# Patient Record
Sex: FEMALE | Employment: FULL TIME | URBAN - METROPOLITAN AREA
[De-identification: names, ages, dates, MRNs, and addresses within clinical notes are randomized per-mention and may not be internally consistent; named-entity substitution may affect disease eponyms.]

---

## 2023-03-13 PROBLEM — R60.9 PERIPHERAL EDEMA: Status: ACTIVE | Noted: 2023-03-13

## 2023-03-13 PROBLEM — N92.6 ABNORMAL MENSES: Status: ACTIVE | Noted: 2023-03-13

## 2023-03-13 PROBLEM — M72.2 PLANTAR FASCIITIS, BILATERAL: Status: ACTIVE | Noted: 2023-03-13

## 2023-03-13 PROBLEM — M79.89 SWELLING OF BOTH LOWER EXTREMITIES: Status: ACTIVE | Noted: 2023-03-13

## 2023-03-13 PROBLEM — R60.0 PERIPHERAL EDEMA: Status: ACTIVE | Noted: 2023-03-13

## 2023-03-13 PROBLEM — I10 HYPERTENSION: Status: ACTIVE | Noted: 2023-03-13

## 2023-03-13 PROBLEM — Z86.16 HISTORY OF COVID-19: Status: ACTIVE | Noted: 2023-03-13

## 2023-03-13 PROBLEM — K21.9 ESOPHAGEAL REFLUX: Status: ACTIVE | Noted: 2023-03-13

## 2023-03-13 PROBLEM — E66.812 CLASS 2 OBESITY WITH BODY MASS INDEX (BMI) OF 38.0 TO 38.9 IN ADULT: Status: ACTIVE | Noted: 2023-03-13

## 2023-03-13 PROBLEM — N64.52 BLOODY DISCHARGE FROM NIPPLE: Status: ACTIVE | Noted: 2023-03-13

## 2023-03-13 PROBLEM — I83.90 VARICOSE VEIN OF LEG: Status: ACTIVE | Noted: 2023-03-13

## 2023-03-13 PROBLEM — B35.1 FUNGAL NAIL INFECTION: Status: ACTIVE | Noted: 2023-03-13

## 2023-03-13 PROBLEM — R53.83 FATIGUE: Status: ACTIVE | Noted: 2023-03-13

## 2023-03-13 PROBLEM — L98.9 SKIN LESIONS: Status: ACTIVE | Noted: 2023-03-13

## 2023-03-13 PROBLEM — G47.30 SAS (SLEEP APNEA SYNDROME): Status: ACTIVE | Noted: 2023-03-13

## 2023-03-13 PROBLEM — E55.9 VITAMIN D DEFICIENCY: Status: ACTIVE | Noted: 2023-03-13

## 2023-03-13 PROBLEM — G47.33 OBSTRUCTIVE SLEEP APNEA, ADULT: Status: ACTIVE | Noted: 2023-03-13

## 2023-03-13 PROBLEM — M21.40 PES PLANUS: Status: ACTIVE | Noted: 2023-03-13

## 2023-03-13 PROBLEM — E66.9 CLASS 2 OBESITY WITH BODY MASS INDEX (BMI) OF 38.0 TO 38.9 IN ADULT: Status: ACTIVE | Noted: 2023-03-13

## 2023-03-13 RX ORDER — BIOTIN 1 MG
1000 TABLET ORAL DAILY
COMMUNITY
Start: 2021-07-27

## 2023-03-13 RX ORDER — LEVONORGESTREL 52 MG/1
INTRAUTERINE DEVICE INTRAUTERINE
COMMUNITY
Start: 2015-05-21

## 2023-03-13 RX ORDER — TRIAMTERENE AND HYDROCHLOROTHIAZIDE 37.5; 25 MG/1; MG/1
1 CAPSULE ORAL DAILY
COMMUNITY
Start: 2016-08-04 | End: 2023-11-07 | Stop reason: SDUPTHER

## 2023-03-13 RX ORDER — ACETAMINOPHEN 500 MG
50 TABLET ORAL DAILY
COMMUNITY
Start: 2021-07-27

## 2023-03-15 ENCOUNTER — OFFICE VISIT (OUTPATIENT)
Dept: PRIMARY CARE | Facility: CLINIC | Age: 43
End: 2023-03-15
Payer: COMMERCIAL

## 2023-03-15 VITALS
HEIGHT: 69 IN | HEART RATE: 69 BPM | WEIGHT: 250 LBS | TEMPERATURE: 97.9 F | SYSTOLIC BLOOD PRESSURE: 120 MMHG | DIASTOLIC BLOOD PRESSURE: 72 MMHG | BODY MASS INDEX: 37.03 KG/M2 | OXYGEN SATURATION: 97 %

## 2023-03-15 DIAGNOSIS — R45.4 DIFFICULTY CONTROLLING ANGER: ICD-10-CM

## 2023-03-15 DIAGNOSIS — Z00.00 PHYSICAL EXAM: Primary | ICD-10-CM

## 2023-03-15 PROCEDURE — 3078F DIAST BP <80 MM HG: CPT | Performed by: NURSE PRACTITIONER

## 2023-03-15 PROCEDURE — 99214 OFFICE O/P EST MOD 30 MIN: CPT | Performed by: NURSE PRACTITIONER

## 2023-03-15 PROCEDURE — 3074F SYST BP LT 130 MM HG: CPT | Performed by: NURSE PRACTITIONER

## 2023-03-15 ASSESSMENT — PATIENT HEALTH QUESTIONNAIRE - PHQ9
1. LITTLE INTEREST OR PLEASURE IN DOING THINGS: NOT AT ALL
2. FEELING DOWN, DEPRESSED OR HOPELESS: NOT AT ALL
SUM OF ALL RESPONSES TO PHQ9 QUESTIONS 1 AND 2: 0

## 2023-03-15 ASSESSMENT — ENCOUNTER SYMPTOMS
DEPRESSION: 0
LOSS OF SENSATION IN FEET: 0
ROS SKIN COMMENTS: AS IN HPI
OCCASIONAL FEELINGS OF UNSTEADINESS: 0

## 2023-03-15 ASSESSMENT — LIFESTYLE VARIABLES
HOW OFTEN DO YOU HAVE A DRINK CONTAINING ALCOHOL: 2-4 TIMES A MONTH
HOW OFTEN DO YOU HAVE A DRINK CONTAINING ALCOHOL: 2-4 TIMES A MONTH

## 2023-03-15 NOTE — PROGRESS NOTES
"Subjective   Patient ID: Marcelle Macdonald is a 42 y.o. female who presents [place] [facility] [place]          HPI   Patient was previously seen by -states she has changed providers because this location is closer to her home.  Patient with past medical history of hypertension states hypertension started after childbirth and preeclampsia.  Currently today blood pressure is controlled.  She also states she gets periodic swelling of her lower extremities.    Patient also discussed that she has some problems with her anger at times and would like to speak to someone on how to manage the anger that she has.  She also states she has a spot on the right side of her lower lip approximately 6 months ago-Denies trauma or injury to the lip.  She states she has not noticed any significant change to the appearance of the discoloration.    Review of Systems   Skin:         As in [facility] [place]   All other systems reviewed and are negative.      Objective   /72   Pulse 69   Temp 36.6 °C (97.9 °F)   Ht 1.753 m (5' 9\")   Wt 113 kg (250 lb)   SpO2 97%   BMI 36.92 kg/m²     Physical Exam  Vitals reviewed.   Constitutional:       Appearance: Normal appearance.   Cardiovascular:      Rate and Rhythm: Normal rate and regular rhythm.   Pulmonary:      Effort: Pulmonary effort is normal.      Breath sounds: Normal breath sounds.   Abdominal:      General: Abdomen is flat.      Palpations: Abdomen is soft.   Musculoskeletal:         General: Normal range of motion.      Cervical back: Normal range of motion and neck supple.   Skin:     General: Skin is warm.      Comments: Small macular darkened pigmentation noted on right side of the lower lip.   Neurological:      Mental Status: She is alert and oriented to person, place, and time.         Assessment/Plan   Problem List Items Addressed This Visit    None  Visit Diagnoses       Physical exam    -  Primary    Relevant Orders    T-Spot TB    CBC    Comprehensive Metabolic Panel    Lipid " Panel    Hemoglobin A1C    Tsh With Reflex To Free T4 If Abnormal    Vitamin D 25-Hydroxy,Total  Patient advised to closely monitor pigment discoloration on lower lip and inform if  any changes.    Will refer as dermatology if needed.     Difficulty controlling anger        Relevant Orders    Referral to Psychology

## 2023-03-15 NOTE — PATIENT INSTRUCTIONS
Continue medications as prescribed.  Healthy diet and drink plenty of water.  Please have labs drawn.    Please schedule all necessary health screenings ophthalmology and dentist.    Follow-up in 6 months sooner if needed.  Call office any new concerns.

## 2023-03-16 ENCOUNTER — TELEPHONE (OUTPATIENT)
Dept: PRIMARY CARE | Facility: CLINIC | Age: 43
End: 2023-03-16

## 2023-03-16 NOTE — TELEPHONE ENCOUNTER
Patient called and asked about her Hemoglobin lab order. She mentioned that the lab was separate and says to be collected unlike the other orders. She wants to know if the order was for labs or in office? She would prefer it to be collected in lab as she would prefer to go to [facility] to get her labs done at a later date.

## 2023-08-16 ENCOUNTER — LAB (OUTPATIENT)
Dept: LAB | Facility: LAB | Age: 43
End: 2023-08-16
Payer: COMMERCIAL

## 2023-08-16 DIAGNOSIS — Z00.00 PHYSICAL EXAM: ICD-10-CM

## 2023-08-16 LAB
ALANINE AMINOTRANSFERASE (SGPT) (U/L) IN SER/PLAS: 12 U/L (ref 7–45)
ALBUMIN (G/DL) IN SER/PLAS: 4.4 G/DL (ref 3.4–5)
ALKALINE PHOSPHATASE (U/L) IN SER/PLAS: 60 U/L (ref 33–110)
ANION GAP IN SER/PLAS: 11 MMOL/L (ref 10–20)
ASPARTATE AMINOTRANSFERASE (SGOT) (U/L) IN SER/PLAS: 12 U/L (ref 9–39)
BILIRUBIN TOTAL (MG/DL) IN SER/PLAS: 0.7 MG/DL (ref 0–1.2)
CALCIDIOL (25 OH VITAMIN D3) (NG/ML) IN SER/PLAS: 35 NG/ML
CALCIUM (MG/DL) IN SER/PLAS: 9.7 MG/DL (ref 8.6–10.3)
CARBON DIOXIDE, TOTAL (MMOL/L) IN SER/PLAS: 29 MMOL/L (ref 21–32)
CHLORIDE (MMOL/L) IN SER/PLAS: 101 MMOL/L (ref 98–107)
CHOLESTEROL (MG/DL) IN SER/PLAS: 177 MG/DL (ref 0–199)
CHOLESTEROL IN HDL (MG/DL) IN SER/PLAS: 43 MG/DL
CHOLESTEROL/HDL RATIO: 4.1
CREATININE (MG/DL) IN SER/PLAS: 0.8 MG/DL (ref 0.5–1.05)
ERYTHROCYTE DISTRIBUTION WIDTH (RATIO) BY AUTOMATED COUNT: 13.7 % (ref 11.5–14.5)
ERYTHROCYTE MEAN CORPUSCULAR HEMOGLOBIN CONCENTRATION (G/DL) BY AUTOMATED: 31.1 G/DL (ref 32–36)
ERYTHROCYTE MEAN CORPUSCULAR VOLUME (FL) BY AUTOMATED COUNT: 87 FL (ref 80–100)
ERYTHROCYTES (10*6/UL) IN BLOOD BY AUTOMATED COUNT: 4.93 X10E12/L (ref 4–5.2)
ESTIMATED AVERAGE GLUCOSE FOR HBA1C: 123 MG/DL
GFR FEMALE: >90 ML/MIN/1.73M2
GLUCOSE (MG/DL) IN SER/PLAS: 91 MG/DL (ref 74–99)
HEMATOCRIT (%) IN BLOOD BY AUTOMATED COUNT: 42.7 % (ref 36–46)
HEMOGLOBIN (G/DL) IN BLOOD: 13.3 G/DL (ref 12–16)
HEMOGLOBIN A1C/HEMOGLOBIN TOTAL IN BLOOD: 5.9 %
LDL: 122 MG/DL (ref 0–99)
LEUKOCYTES (10*3/UL) IN BLOOD BY AUTOMATED COUNT: 8.9 X10E9/L (ref 4.4–11.3)
NRBC (PER 100 WBCS) BY AUTOMATED COUNT: 0 /100 WBC (ref 0–0)
PLATELETS (10*3/UL) IN BLOOD AUTOMATED COUNT: 270 X10E9/L (ref 150–450)
POTASSIUM (MMOL/L) IN SER/PLAS: 4.3 MMOL/L (ref 3.5–5.3)
PROTEIN TOTAL: 7.2 G/DL (ref 6.4–8.2)
SODIUM (MMOL/L) IN SER/PLAS: 137 MMOL/L (ref 136–145)
THYROTROPIN (MIU/L) IN SER/PLAS BY DETECTION LIMIT <= 0.05 MIU/L: 2.01 MIU/L (ref 0.44–3.98)
TRIGLYCERIDE (MG/DL) IN SER/PLAS: 58 MG/DL (ref 0–149)
UREA NITROGEN (MG/DL) IN SER/PLAS: 11 MG/DL (ref 6–23)
VLDL: 12 MG/DL (ref 0–40)

## 2023-08-16 PROCEDURE — 82306 VITAMIN D 25 HYDROXY: CPT

## 2023-08-16 PROCEDURE — 80053 COMPREHEN METABOLIC PANEL: CPT

## 2023-08-16 PROCEDURE — 80061 LIPID PANEL: CPT

## 2023-08-16 PROCEDURE — 85027 COMPLETE CBC AUTOMATED: CPT

## 2023-08-16 PROCEDURE — 86481 TB AG RESPONSE T-CELL SUSP: CPT

## 2023-08-16 PROCEDURE — 36415 COLL VENOUS BLD VENIPUNCTURE: CPT

## 2023-08-16 PROCEDURE — 84443 ASSAY THYROID STIM HORMONE: CPT

## 2023-08-16 PROCEDURE — 83036 HEMOGLOBIN GLYCOSYLATED A1C: CPT

## 2023-08-17 ENCOUNTER — OFFICE VISIT (OUTPATIENT)
Dept: PRIMARY CARE | Facility: CLINIC | Age: 43
End: 2023-08-17
Payer: COMMERCIAL

## 2023-08-17 VITALS
DIASTOLIC BLOOD PRESSURE: 76 MMHG | HEART RATE: 76 BPM | RESPIRATION RATE: 17 BRPM | OXYGEN SATURATION: 97 % | BODY MASS INDEX: 37.38 KG/M2 | SYSTOLIC BLOOD PRESSURE: 138 MMHG | TEMPERATURE: 98.3 F | WEIGHT: 253.1 LBS

## 2023-08-17 DIAGNOSIS — R20.9 ABNORMAL ARM SENSATION: Primary | ICD-10-CM

## 2023-08-17 DIAGNOSIS — I99.9 CIRCULATION PROBLEM: ICD-10-CM

## 2023-08-17 PROCEDURE — 99214 OFFICE O/P EST MOD 30 MIN: CPT | Performed by: NURSE PRACTITIONER

## 2023-08-17 PROCEDURE — 1036F TOBACCO NON-USER: CPT | Performed by: NURSE PRACTITIONER

## 2023-08-17 PROCEDURE — 3078F DIAST BP <80 MM HG: CPT | Performed by: NURSE PRACTITIONER

## 2023-08-17 PROCEDURE — 3075F SYST BP GE 130 - 139MM HG: CPT | Performed by: NURSE PRACTITIONER

## 2023-08-17 ASSESSMENT — ENCOUNTER SYMPTOMS
NUMBNESS: 0
SHORTNESS OF BREATH: 0

## 2023-08-17 ASSESSMENT — PAIN SCALES - GENERAL: PAINLEVEL: 5

## 2023-08-17 NOTE — PROGRESS NOTES
"Subjective   Patient ID: Marcelle Macdonald is a 43 y.o. female who presents for Arm Pain (No chest pain or numbness. Reports ongoing for about 4 days. Anterior forearm and elbow down into the wrist. Not sharp pain, pulling sensation. Doesn't hurt to lift or ROM. Reports markings near these areas where it hurts. Most discomfort at HS. ).    Arm Pain   Pertinent negatives include no chest pain or numbness.      Patient presents to clinic for evaluation of left arm pain x4 days.  She denies trauma or injury to the arm.  She complains of feeling a \"pulling\" sensation in the left arm.  She does not describe it as a ache or pain.  She states arm became very painful after walking as well as she has pain with when sleeping.  Denies swelling discoloration or temperature changes or numbness to the arm.  She does state that she noticed the veins in the arm are more pronounced.    She states she has a vascular problems in her family and she has varicose veins and would like to be seen by someone in vascular medicine.  Discussed ultrasound of left arm with patient declines at this time.    Review of Systems   Respiratory:  Negative for shortness of breath.    Cardiovascular:  Negative for chest pain.   Musculoskeletal:         See HPI.   Neurological:  Negative for numbness.   All other systems reviewed and are negative.      Objective   /76 (BP Location: Right arm, Patient Position: Sitting, BP Cuff Size: Large adult)   Pulse 76   Temp 36.8 °C (98.3 °F) (Temporal)   Resp 17   Wt 115 kg (253 lb 1.6 oz)   SpO2 97%   BMI 37.38 kg/m²     Physical Exam  Vitals reviewed.   Constitutional:       Appearance: Normal appearance. She is not ill-appearing.   Musculoskeletal:         General: No swelling, tenderness, deformity or signs of injury. Normal range of motion.      Comments: Neurovascular intact to left arm.   Skin:     General: Skin is warm and dry.   Neurological:      Mental Status: She is alert and oriented to " person, place, and time.         Assessment/Plan   Problem List Items Addressed This Visit    None  Visit Diagnoses       Abnormal arm sensation    -  Primary    Relevant Orders    Referral to Vascular Medicine    Circulation problem        Relevant Orders    Referral to Vascular Medicine  Cool compresses

## 2023-08-17 NOTE — PATIENT INSTRUCTIONS
Tylenol/Ibuprofen as needed.  Ice packs as needed.  Follow-up with vascular medicine for further evaluation.  Follow-up 3 months.

## 2023-08-19 LAB
NIL(NEG) CONTROL SPOT COUNT: NORMAL
PANEL A SPOT COUNT: 1
PANEL B SPOT COUNT: 1
POS CONTROL SPOT COUNT: NORMAL
T-SPOT. TB INTERPRETATION: NEGATIVE

## 2023-10-09 ENCOUNTER — APPOINTMENT (OUTPATIENT)
Dept: CARDIOLOGY | Facility: CLINIC | Age: 43
End: 2023-10-09
Payer: COMMERCIAL

## 2023-11-06 ENCOUNTER — OFFICE VISIT (OUTPATIENT)
Dept: CARDIOLOGY | Facility: CLINIC | Age: 43
End: 2023-11-06
Payer: COMMERCIAL

## 2023-11-06 VITALS
BODY MASS INDEX: 38.24 KG/M2 | HEIGHT: 68 IN | DIASTOLIC BLOOD PRESSURE: 87 MMHG | SYSTOLIC BLOOD PRESSURE: 143 MMHG | OXYGEN SATURATION: 97 % | WEIGHT: 252.31 LBS | HEART RATE: 70 BPM

## 2023-11-06 DIAGNOSIS — M79.602 LEFT ARM PAIN: Primary | ICD-10-CM

## 2023-11-06 PROCEDURE — 3079F DIAST BP 80-89 MM HG: CPT | Performed by: INTERNAL MEDICINE

## 2023-11-06 PROCEDURE — 99213 OFFICE O/P EST LOW 20 MIN: CPT | Performed by: INTERNAL MEDICINE

## 2023-11-06 PROCEDURE — 3077F SYST BP >= 140 MM HG: CPT | Performed by: INTERNAL MEDICINE

## 2023-11-06 PROCEDURE — 99203 OFFICE O/P NEW LOW 30 MIN: CPT | Performed by: INTERNAL MEDICINE

## 2023-11-06 PROCEDURE — 1036F TOBACCO NON-USER: CPT | Performed by: INTERNAL MEDICINE

## 2023-11-06 ASSESSMENT — COLUMBIA-SUICIDE SEVERITY RATING SCALE - C-SSRS
1. IN THE PAST MONTH, HAVE YOU WISHED YOU WERE DEAD OR WISHED YOU COULD GO TO SLEEP AND NOT WAKE UP?: NO
2. HAVE YOU ACTUALLY HAD ANY THOUGHTS OF KILLING YOURSELF?: NO
6. HAVE YOU EVER DONE ANYTHING, STARTED TO DO ANYTHING, OR PREPARED TO DO ANYTHING TO END YOUR LIFE?: NO

## 2023-11-06 ASSESSMENT — PAIN SCALES - GENERAL: PAINLEVEL: 0-NO PAIN

## 2023-11-06 ASSESSMENT — PATIENT HEALTH QUESTIONNAIRE - PHQ9
SUM OF ALL RESPONSES TO PHQ9 QUESTIONS 1 AND 2: 0
2. FEELING DOWN, DEPRESSED OR HOPELESS: NOT AT ALL
1. LITTLE INTEREST OR PLEASURE IN DOING THINGS: NOT AT ALL

## 2023-11-06 NOTE — PROGRESS NOTES
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 Had VV, could not take hormones, but the patient is not sure why  Multiple family members with vascular issues, details are not clear    Review of Systems  As noted above  Overweight  Bilateral intermittent lower extremity swelling, better in the a.m. worse at the end of the day  Bilateral bloody nipple discharge in 2022, mammogram with no abnormality, resolved on its own  No other complaints today    Past Medical History:   Diagnosis Date    Abnormal weight gain 11/12/2013    Abnormal weight gain    Acute bronchitis, unspecified 11/12/2013    Acute bronchitis    Acute vaginitis 11/12/2013    Vaginitis    Acute vaginitis 04/11/2016    Bacterial vaginosis    Cellulitis, unspecified 11/12/2013    Cellulitis    Depression, unspecified 11/12/2013    Depression    Eclampsia, unspecified as to time period     Eclampsia    Encounter for screening for respiratory tuberculosis 07/27/2021    Tuberculosis screening    Frequency of micturition 11/12/2013    Urinary frequency    Nausea 11/12/2013    Nausea    Other insect allergy status 05/23/2018    History of insect sting allergy    Other specified soft tissue disorders 11/12/2013    Limb swelling    Pain in unspecified knee 11/12/2013    Joint pain, knee    Personal history of diseases of the blood and blood-forming organs and certain disorders involving the immune mechanism 02/21/2014     History of anemia    Personal history of other diseases of the musculoskeletal system and connective tissue 11/12/2013    History of backache    Personal history of other diseases of the nervous system and sense organs 08/29/2018    History of sciatica    Personal history of other diseases of the respiratory system 01/06/2017    History of bronchitis    Personal history of other diseases of the respiratory system 11/03/2014    History of acute bronchitis    Personal history of other infectious and parasitic diseases 02/21/2014    History of athlete's foot    Personal history of other specified conditions 02/06/2017    History of fatigue    Personal history of other specified conditions 07/27/2021    History of chest pain    Pneumonia, unspecified organism 11/29/2017    Community acquired pneumonia    Pregnant state, incidental 11/12/2013    Pregnancy, incidental    Strain of muscle, fascia and tendon of lower back, initial encounter 04/22/2015    Lumbar strain    Tinea pedis 08/11/2020    Tinea pedis of both feet    Toxic effect of venom of wasps, accidental (unintentional), initial encounter 05/23/2018    Wasp sting    Unspecified asthma, uncomplicated 01/21/2016    Asthmatic bronchitis    Urinary tract infection, site not specified 11/12/2013    Urinary tract infection     No past surgical history on file.  Social History     Tobacco Use    Smoking status: Never    Smokeless tobacco: Never   Substance Use Topics    Alcohol use: Never    Drug use: Never      Family History   Problem Relation Name Age of Onset    Coronary artery disease Mother      Liver cancer Mother      Diabetes type II Mother      Coronary artery disease Father      Other (Blood clots) Sister      Coronary artery disease Sister      Factor V Leiden deficiency Sister          '82 sister    No Known Problems Brother      No Known Problems Daughter      No Known Problems Son      No Known Problems Mother's Sister      No Known Problems Mother's  "Brother      No Known Problems Father's Sister      No Known Problems Father's Brother      No Known Problems Maternal Grandmother      Lung disease Maternal Grandfather          Interstitial    Breast cancer Paternal Grandmother      No Known Problems Paternal Grandfather      No Known Problems Other        Allergies   Allergen Reactions    Tinidazole Other     Chest pain       Objective   Physical Exam  /87 (BP Location: Right arm, Patient Position: Sitting)   Pulse 70   Ht 1.727 m (5' 8\")   Wt 114 kg (252 lb 5 oz)   BMI 38.36 kg/m²      General:  In no acute distress  Neuro: alert and oriented x3  CV:  RRR  Lungs: CTA bilaterally  Abd:  Soft, non-tender   Psych:  Appropriate affect  Upper extremities: No swelling, +2 radial/ulnar, L UE prominent nonpalpable localized varicose veins  Lower extremities: Bilateral trace edema, +2 DP  Skin:  No open ulcers.      Medications   Current Outpatient Medications on File Prior to Visit   Medication Sig Dispense Refill    biotin 1 mg tablet Take 1 tablet (1,000 mcg) by mouth once daily. Take as directed      cholecalciferol (Vitamin D-3) 50 mcg (2,000 unit) capsule Take 1 capsule (50 mcg) by mouth early in the morning..      levonorgestrel (Mirena) 21 mcg/24 hours (8 yrs) 52 mg IUD Use as directed      triamterene-hydrochlorothiazid (Dyazide) 37.5-25 mg capsule Take 1 capsule by mouth once daily.       No current facility-administered medications on file prior to visit.       Lab Review   Lab Results   Component Value Date     08/16/2023    K 4.3 08/16/2023     08/16/2023    CO2 29 08/16/2023    BUN 11 08/16/2023    CREATININE 0.80 08/16/2023    GLUCOSE 91 08/16/2023    CALCIUM 9.7 08/16/2023     Lab Results   Component Value Date    WBC 8.9 08/16/2023    HGB 13.3 08/16/2023    HCT 42.7 08/16/2023    MCV 87 08/16/2023     08/16/2023       PTT - No results in last year.  _   _ _     Lab Results   Component Value Date    CHOL 177 08/16/2023    CHOL " 186 07/20/2022    CHOL 164 07/26/2019    HDL 43.0 08/16/2023    HDL 47.0 07/20/2022    HDL 39.6 (A) 07/26/2019    TRIG 58 08/16/2023    TRIG 80 07/20/2022    TRIG 84 07/26/2019       Lab Results   Component Value Date    HGBA1C 5.9 (A) 08/16/2023       Imaging  No results found.    Assessment/Plan   Marcelle Macdonald is a 43 y.o. female with past medical history as noted, following with vascular medicine for left upper extremity pain and more prominent veins    Etiology is not entirely clear, we will proceed with PVR with TOS maneuvers and left upper extremity DVT ultrasound, if negative suggest neurological evaluation    Misty Ramírez MD

## 2023-11-07 ENCOUNTER — PATIENT MESSAGE (OUTPATIENT)
Dept: PRIMARY CARE | Facility: CLINIC | Age: 43
End: 2023-11-07
Payer: COMMERCIAL

## 2023-11-07 DIAGNOSIS — I10 HYPERTENSION, UNSPECIFIED TYPE: Primary | ICD-10-CM

## 2023-11-07 RX ORDER — TRIAMTERENE AND HYDROCHLOROTHIAZIDE 37.5; 25 MG/1; MG/1
1 CAPSULE ORAL DAILY
Qty: 90 CAPSULE | Refills: 2 | Status: SHIPPED | OUTPATIENT
Start: 2023-11-07 | End: 2024-08-03

## 2023-11-07 NOTE — TELEPHONE ENCOUNTER
From: Marcelle Macdonald  To: Janice Keith, APRN-CNP  Sent: 11/7/2023 8:13 AM EST  Subject: Medication Refill    Hello,   I am requesting a 90 day supply refill of my Triamterene-HCTZ 37.5-25 to [facility] in [place]. I have an appointment in Nov. Thank you so much.

## 2023-11-29 ENCOUNTER — APPOINTMENT (OUTPATIENT)
Dept: PRIMARY CARE | Facility: CLINIC | Age: 43
End: 2023-11-29
Payer: COMMERCIAL

## 2023-12-18 ENCOUNTER — OFFICE VISIT (OUTPATIENT)
Dept: PRIMARY CARE | Facility: CLINIC | Age: 43
End: 2023-12-18
Payer: COMMERCIAL

## 2023-12-18 VITALS
SYSTOLIC BLOOD PRESSURE: 122 MMHG | OXYGEN SATURATION: 97 % | RESPIRATION RATE: 18 BRPM | WEIGHT: 255.1 LBS | DIASTOLIC BLOOD PRESSURE: 80 MMHG | BODY MASS INDEX: 38.79 KG/M2 | HEART RATE: 82 BPM | TEMPERATURE: 97.3 F

## 2023-12-18 DIAGNOSIS — I10 HYPERTENSION, UNSPECIFIED TYPE: Primary | ICD-10-CM

## 2023-12-18 PROCEDURE — 1036F TOBACCO NON-USER: CPT | Performed by: NURSE PRACTITIONER

## 2023-12-18 PROCEDURE — 3079F DIAST BP 80-89 MM HG: CPT | Performed by: NURSE PRACTITIONER

## 2023-12-18 PROCEDURE — 3074F SYST BP LT 130 MM HG: CPT | Performed by: NURSE PRACTITIONER

## 2023-12-18 PROCEDURE — 99214 OFFICE O/P EST MOD 30 MIN: CPT | Performed by: NURSE PRACTITIONER

## 2023-12-18 ASSESSMENT — PAIN SCALES - GENERAL: PAINLEVEL: 0-NO PAIN

## 2023-12-18 NOTE — PROGRESS NOTES
Subjective   Patient ID: Marcelle Macdonald is a 43 y.o. female who presents for 3 month follow-up (No specific concerns to discuss. ).    HPI     Patient presents to clinic for 3-month follow-up visit.  States she is doing well no specific complaints or concerns.  Checks blood pressures at home and at work blood pressures have been within normal range.    Appetite normal bowel and bladder normal.      Review of Systems   All other systems reviewed and are negative.      Objective   /80 (BP Location: Right arm, Patient Position: Sitting, BP Cuff Size: Large adult)   Pulse 82   Temp 36.3 °C (97.3 °F) (Temporal)   Resp 18   Wt 116 kg (255 lb 1.6 oz)   SpO2 97%   BMI 38.79 kg/m²     Physical Exam  Vitals reviewed.   Constitutional:       Appearance: Normal appearance. She is not ill-appearing.   HENT:      Mouth/Throat:      Mouth: Mucous membranes are moist.      Pharynx: Oropharynx is clear.   Cardiovascular:      Rate and Rhythm: Normal rate and regular rhythm.   Pulmonary:      Effort: Pulmonary effort is normal.      Breath sounds: Normal breath sounds.   Abdominal:      Palpations: Abdomen is soft.      Tenderness: There is no abdominal tenderness.   Skin:     General: Skin is warm and dry.   Neurological:      Mental Status: She is alert and oriented to person, place, and time.         Assessment/Plan   Problem List Items Addressed This Visit       Hypertension - Primary-controlled 122/80  Continue triamterene hydrochlorothiazide daily.   Healthy low-sodium diet  Blood pressure checks at home  Follow-up in 6 months.

## 2023-12-18 NOTE — PATIENT INSTRUCTIONS
Continue medications as prescribed.  Healthy diet and drink plenty of water.  Please schedule all necessary health screenings ophthalmology and dentist.    Follow-up in 6 months with new PCP.  Continue to check Blood pressures at home.  Call office any new concerns.

## 2023-12-19 ENCOUNTER — HOSPITAL ENCOUNTER (OUTPATIENT)
Dept: VASCULAR MEDICINE | Facility: CLINIC | Age: 43
Discharge: HOME | End: 2023-12-19
Payer: COMMERCIAL

## 2023-12-19 DIAGNOSIS — M79.602 LEFT ARM PAIN: ICD-10-CM

## 2023-12-19 PROCEDURE — 93923 UPR/LXTR ART STDY 3+ LVLS: CPT

## 2023-12-19 PROCEDURE — 93923 UPR/LXTR ART STDY 3+ LVLS: CPT | Performed by: SURGERY

## 2023-12-19 PROCEDURE — 93971 EXTREMITY STUDY: CPT

## 2023-12-19 PROCEDURE — 93971 EXTREMITY STUDY: CPT | Performed by: SURGERY

## 2023-12-26 ENCOUNTER — TELEPHONE (OUTPATIENT)
Dept: CARDIOLOGY | Facility: CLINIC | Age: 43
End: 2023-12-26
Payer: COMMERCIAL

## 2023-12-26 DIAGNOSIS — G54.0 TOS (THORACIC OUTLET SYNDROME): Primary | ICD-10-CM

## 2023-12-26 NOTE — TELEPHONE ENCOUNTER
Result Communication    Resulted Orders   Vascular US Upper Extremity PVR For TOS    Narrative              [place] [address]  Tel [phone] and Fax [phone]       Vascular Lab Report  VASC US UPPER EXTREMITY PVR FOR TOS       Patient Name:     VERN HOLLY    Reji Physician: [id number] Opal Addison MD  Study Date:       12/19/2023          Ordering           [id number] CLARKFELIPE FELIPE                                        Physician:         NEELA  MRN/PID:          [id number]            Technologist:      Swapna Cool RVT  Accession#:       [id number]        Technologist 2:  Date of           1980 / 43      Encounter#:        [id number]  Birth/Age:        years  Gender:           F  Admission Status: Outpatient          Location           [facility]                                        Performed:       Diagnosis/ICD: Pain in left arm-M79.602  Indication:    Limb pain,       CONCLUSIONS:  Right Upper PVR: A decrease in PPG waveform amplitude was noted in the alternative position; patient laying down (supine) with arm relaxed at patients side on the bed.  Left Upper PVR: A decrease in PPG waveform amplitude was noted in the alternative position; patient laying down (supine) with arm relaxed at patients side on the bed.     Imaging & Doppler Findings:     Right Outcome     TOS Maneuver     Left Outcome    negative          Baseline         negative    negative          [place]          negative    negative                   negative    negative      Arm 180 degrees      negative    negative       Arm 90 degrees      negative    positive    Alternative Position   positive                       Right     Left  Brachial Pressure 134 mmHg 127 mmHg          [id number] Opal Addison MD  Electronically signed by [id number] Opal Addison MD on 12/20/2023 at 8:54:11 PM         ** Final **          12:53 PM      Results were successfully communicated with the patient and they acknowledged their understanding.  Pt asked to send her the scheduling phone number for Dr Salazar via My chart. This nurse, Vibha did so.

## 2024-02-12 ENCOUNTER — OFFICE VISIT (OUTPATIENT)
Dept: CARDIOLOGY | Facility: CLINIC | Age: 44
End: 2024-02-12
Payer: COMMERCIAL

## 2024-02-12 ENCOUNTER — OFFICE VISIT (OUTPATIENT)
Dept: PRIMARY CARE | Facility: CLINIC | Age: 44
End: 2024-02-12
Payer: COMMERCIAL

## 2024-02-12 VITALS
DIASTOLIC BLOOD PRESSURE: 84 MMHG | WEIGHT: 254 LBS | HEART RATE: 78 BPM | BODY MASS INDEX: 38.49 KG/M2 | SYSTOLIC BLOOD PRESSURE: 140 MMHG | HEIGHT: 68 IN | OXYGEN SATURATION: 99 %

## 2024-02-12 VITALS
HEIGHT: 69 IN | WEIGHT: 258 LBS | OXYGEN SATURATION: 96 % | HEART RATE: 65 BPM | DIASTOLIC BLOOD PRESSURE: 83 MMHG | BODY MASS INDEX: 38.21 KG/M2 | TEMPERATURE: 97.7 F | SYSTOLIC BLOOD PRESSURE: 143 MMHG

## 2024-02-12 DIAGNOSIS — M79.602 LEFT ARM PAIN: Primary | ICD-10-CM

## 2024-02-12 DIAGNOSIS — Z00.00 ROUTINE GENERAL MEDICAL EXAMINATION AT A HEALTH CARE FACILITY: Primary | ICD-10-CM

## 2024-02-12 DIAGNOSIS — I10 HYPERTENSION, UNSPECIFIED TYPE: ICD-10-CM

## 2024-02-12 DIAGNOSIS — R73.03 PREDIABETES: ICD-10-CM

## 2024-02-12 DIAGNOSIS — R20.0 LEFT ARM NUMBNESS: ICD-10-CM

## 2024-02-12 DIAGNOSIS — R20.0 LEFT LEG NUMBNESS: ICD-10-CM

## 2024-02-12 DIAGNOSIS — G54.0 TOS (THORACIC OUTLET SYNDROME): ICD-10-CM

## 2024-02-12 PROBLEM — L98.9 SKIN LESIONS: Status: RESOLVED | Noted: 2023-03-13 | Resolved: 2024-02-12

## 2024-02-12 PROBLEM — R53.83 FATIGUE: Status: RESOLVED | Noted: 2023-03-13 | Resolved: 2024-02-12

## 2024-02-12 PROBLEM — M79.89 SWELLING OF BOTH LOWER EXTREMITIES: Status: RESOLVED | Noted: 2023-03-13 | Resolved: 2024-02-12

## 2024-02-12 PROBLEM — G47.30 SAS (SLEEP APNEA SYNDROME): Status: RESOLVED | Noted: 2023-03-13 | Resolved: 2024-02-12

## 2024-02-12 LAB — POC HEMOGLOBIN A1C: 5.7 % (ref 4.2–6.5)

## 2024-02-12 PROCEDURE — 3079F DIAST BP 80-89 MM HG: CPT | Performed by: INTERNAL MEDICINE

## 2024-02-12 PROCEDURE — 3077F SYST BP >= 140 MM HG: CPT | Performed by: NURSE PRACTITIONER

## 2024-02-12 PROCEDURE — 83036 HEMOGLOBIN GLYCOSYLATED A1C: CPT | Performed by: NURSE PRACTITIONER

## 2024-02-12 PROCEDURE — 1036F TOBACCO NON-USER: CPT | Performed by: INTERNAL MEDICINE

## 2024-02-12 PROCEDURE — 1036F TOBACCO NON-USER: CPT | Performed by: NURSE PRACTITIONER

## 2024-02-12 PROCEDURE — 99396 PREV VISIT EST AGE 40-64: CPT | Performed by: NURSE PRACTITIONER

## 2024-02-12 PROCEDURE — 99214 OFFICE O/P EST MOD 30 MIN: CPT | Performed by: INTERNAL MEDICINE

## 2024-02-12 PROCEDURE — 93000 ELECTROCARDIOGRAM COMPLETE: CPT | Performed by: NURSE PRACTITIONER

## 2024-02-12 PROCEDURE — 3077F SYST BP >= 140 MM HG: CPT | Performed by: INTERNAL MEDICINE

## 2024-02-12 PROCEDURE — 3008F BODY MASS INDEX DOCD: CPT | Performed by: NURSE PRACTITIONER

## 2024-02-12 PROCEDURE — 3079F DIAST BP 80-89 MM HG: CPT | Performed by: NURSE PRACTITIONER

## 2024-02-12 ASSESSMENT — ENCOUNTER SYMPTOMS
POLYPHAGIA: 0
ABDOMINAL PAIN: 0
UNEXPECTED WEIGHT CHANGE: 0
DYSURIA: 0
WOUND: 0
TROUBLE SWALLOWING: 0
ADENOPATHY: 0
CHILLS: 0
BLOOD IN STOOL: 0
CONFUSION: 0
NECK PAIN: 0
APPETITE CHANGE: 0
EYE PAIN: 0
FEVER: 0
FREQUENCY: 0
BACK PAIN: 0
PALPITATIONS: 0
SHORTNESS OF BREATH: 0
EYE REDNESS: 0
SORE THROAT: 0
BRUISES/BLEEDS EASILY: 0
HEADACHES: 0
COUGH: 0
EYE DISCHARGE: 0
VOMITING: 0
HEMATURIA: 0
DIZZINESS: 0
FATIGUE: 0
POLYDIPSIA: 0
HALLUCINATIONS: 0

## 2024-02-12 ASSESSMENT — ANXIETY QUESTIONNAIRES
GAD7 TOTAL SCORE: 1
IF YOU CHECKED OFF ANY PROBLEMS ON THIS QUESTIONNAIRE, HOW DIFFICULT HAVE THESE PROBLEMS MADE IT FOR YOU TO DO YOUR WORK, TAKE CARE OF THINGS AT HOME, OR GET ALONG WITH OTHER PEOPLE: NOT DIFFICULT AT ALL
1. FEELING NERVOUS, ANXIOUS, OR ON EDGE: NOT AT ALL
5. BEING SO RESTLESS THAT IT IS HARD TO SIT STILL: NOT AT ALL
4. TROUBLE RELAXING: NOT AT ALL
3. WORRYING TOO MUCH ABOUT DIFFERENT THINGS: NOT AT ALL
6. BECOMING EASILY ANNOYED OR IRRITABLE: SEVERAL DAYS
2. NOT BEING ABLE TO STOP OR CONTROL WORRYING: NOT AT ALL
7. FEELING AFRAID AS IF SOMETHING AWFUL MIGHT HAPPEN: NOT AT ALL

## 2024-02-12 ASSESSMENT — PATIENT HEALTH QUESTIONNAIRE - PHQ9
4. FEELING TIRED OR HAVING LITTLE ENERGY: SEVERAL DAYS
9. THOUGHTS THAT YOU WOULD BE BETTER OFF DEAD, OR OF HURTING YOURSELF: NOT AT ALL
4. FEELING TIRED OR HAVING LITTLE ENERGY: SEVERAL DAYS
6. FEELING BAD ABOUT YOURSELF - OR THAT YOU ARE A FAILURE OR HAVE LET YOURSELF OR YOUR FAMILY DOWN: SEVERAL DAYS
2. FEELING DOWN, DEPRESSED OR HOPELESS: NOT AT ALL
10. IF YOU CHECKED OFF ANY PROBLEMS, HOW DIFFICULT HAVE THESE PROBLEMS MADE IT FOR YOU TO DO YOUR WORK, TAKE CARE OF THINGS AT HOME, OR GET ALONG WITH OTHER PEOPLE: SOMEWHAT DIFFICULT
8. MOVING OR SPEAKING SO SLOWLY THAT OTHER PEOPLE COULD HAVE NOTICED. OR THE OPPOSITE, BEING SO FIGETY OR RESTLESS THAT YOU HAVE BEEN MOVING AROUND A LOT MORE THAN USUAL: NOT AT ALL
5. POOR APPETITE OR OVEREATING: SEVERAL DAYS
SUM OF ALL RESPONSES TO PHQ QUESTIONS 1-9: 4
10. IF YOU CHECKED OFF ANY PROBLEMS, HOW DIFFICULT HAVE THESE PROBLEMS MADE IT FOR YOU TO DO YOUR WORK, TAKE CARE OF THINGS AT HOME, OR GET ALONG WITH OTHER PEOPLE: NOT DIFFICULT AT ALL
1. LITTLE INTEREST OR PLEASURE IN DOING THINGS: NOT AT ALL
SUM OF ALL RESPONSES TO PHQ9 QUESTIONS 1 AND 2: 0
8. MOVING OR SPEAKING SO SLOWLY THAT OTHER PEOPLE COULD HAVE NOTICED. OR THE OPPOSITE, BEING SO FIGETY OR RESTLESS THAT YOU HAVE BEEN MOVING AROUND A LOT MORE THAN USUAL: NOT AT ALL
SUM OF ALL RESPONSES TO PHQ QUESTIONS 1-9: 4
1. LITTLE INTEREST OR PLEASURE IN DOING THINGS: NOT AT ALL
2. FEELING DOWN, DEPRESSED OR HOPELESS: NOT AT ALL
SUM OF ALL RESPONSES TO PHQ9 QUESTIONS 1 AND 2: 0
7. TROUBLE CONCENTRATING ON THINGS, SUCH AS READING THE NEWSPAPER OR WATCHING TELEVISION: SEVERAL DAYS
7. TROUBLE CONCENTRATING ON THINGS, SUCH AS READING THE NEWSPAPER OR WATCHING TELEVISION: SEVERAL DAYS
3. TROUBLE FALLING OR STAYING ASLEEP OR SLEEPING TOO MUCH: NOT AT ALL
9. THOUGHTS THAT YOU WOULD BE BETTER OFF DEAD, OR OF HURTING YOURSELF: NOT AT ALL
6. FEELING BAD ABOUT YOURSELF - OR THAT YOU ARE A FAILURE OR HAVE LET YOURSELF OR YOUR FAMILY DOWN: SEVERAL DAYS
3. TROUBLE FALLING OR STAYING ASLEEP OR SLEEPING TOO MUCH: NOT AT ALL
5. POOR APPETITE OR OVEREATING: SEVERAL DAYS

## 2024-02-12 ASSESSMENT — PAIN SCALES - GENERAL: PAINLEVEL: 0-NO PAIN

## 2024-02-12 NOTE — PROGRESS NOTES
Chief complaint: Left arm pain     Subjective   DVT US is negative but her arterial ultrasound is suggesting arterial compression in certain position, referred to Dr. Salazar (vascular surgery and expert in compression syndromes)   Since last visit, the patient noticed that her left arm pain is not only positional but becoming constant, associated with numbness/tingling and discomfort as well as noticed LLE similar symptoms    HPI  Around few months ago, the patient started having left arm pain of unclear etiology  It felt like pulling almost all the time, waking her up at night from sleep (she sleeps in supine position because of the CPAP) associated with numbness  It was there for at least 3 weeks, one day she had popping veins and right after her pain resolved    Review of Systems  As noted above  Overweight  Bilateral intermittent lower extremity swelling, better in the a.m. worse at the end of the day  Bilateral bloody nipple discharge in 2022, mammogram with no abnormality, resolved on its own  She denies any neck pain  No arm swelling or weakness  No change in symptoms by neck or arm movement    FH  Mom, passed away. Had VV, could not take hormones, but the patient is not sure why  Sister + VTE after ankle surgery  Multiple family members with vascular issues, details are not clear    Past Medical History:   Diagnosis Date    Abnormal menses     Bloody discharge from nipple     BMI 38.0-38.9,adult     Class 2 obesity with body mass index (BMI) of 38.0 to 38.9 in adult     Esophageal reflux     Fatigue     History of COVID-19     Hypertension     Obstructive sleep apnea, adult     Peripheral edema     Pes planus     Plantar fasciitis, bilateral     SAS (sleep apnea syndrome)     Skin lesions     Swelling of both lower extremities     Varicose vein of leg     Vitamin D deficiency      No past surgical history on file.  Social History     Tobacco Use    Smoking status: Never    Smokeless tobacco: Never   Substance  "Use Topics    Alcohol use: Never    Drug use: Never      Family History   Problem Relation Name Age of Onset    Coronary artery disease Mother      Liver cancer Mother      Diabetes type II Mother      Coronary artery disease Father      Other (Blood clots) Sister      Coronary artery disease Sister      Factor V Leiden deficiency Sister          '82 sister    No Known Problems Brother      No Known Problems Daughter      No Known Problems Son      No Known Problems Mother's Sister      No Known Problems Mother's Brother      No Known Problems Father's Sister      No Known Problems Father's Brother      No Known Problems Maternal Grandmother      Lung disease Maternal Grandfather          Interstitial    Breast cancer Paternal Grandmother      No Known Problems Paternal Grandfather      No Known Problems Other        Allergies   Allergen Reactions    Tinidazole Other     Chest pain       Objective   Physical Exam  /84 (BP Location: Left arm, Patient Position: Sitting)   Pulse 78   Ht 1.727 m (5' 8\")   Wt 115 kg (254 lb)   BMI 38.62 kg/m²      General:  In no acute distress  Neuro: alert and oriented x3  CV:  RRR  Lungs: CTA bilaterally  Abd:  Soft, non-tender   Psych:  Appropriate affect  Upper extremities: No swelling, +2 radial/ulnar, +1 radial  In supine position with relaxed arms, L UE prominent nonpalpable localized varicose veins  Lower extremities: Bilateral trace edema- improved, +2 DP  Skin:  No open ulcers.      Medications     Current Outpatient Medications:     biotin 1 mg tablet, Take 1 tablet (1,000 mcg) by mouth once daily. Take as directed, Disp: , Rfl:     cholecalciferol (Vitamin D-3) 50 mcg (2,000 unit) capsule, Take 1 capsule (50 mcg) by mouth early in the morning.., Disp: , Rfl:     levonorgestrel (Mirena) 21 mcg/24 hours (8 yrs) 52 mg IUD, Use as directed, Disp: , Rfl:     triamterene-hydrochlorothiazid (Dyazide) 37.5-25 mg capsule, Take 1 capsule by mouth once daily., Disp: 90 " capsule, Rfl: 2    Lab Review   Recent Labs     08/16/23  0936 07/20/22  1452 01/21/22  1205 07/26/19  1001    139 137 136   K 4.3 4.0 3.9 4.3    100 100 102   CO2 29 30 30 24   ANIONGAP 11 13 11 14   BUN 11 10 11 10   CREATININE 0.80 0.78 0.72 0.68     Recent Labs     08/16/23  0936 07/20/22  1452 01/21/22  1205 07/26/19  1001   ALBUMIN 4.4 4.5 4.3 4.2   ALKPHOS 60 70 59 67   ALT 12 13 13 14   AST 12 14 12 13   BILITOT 0.7 0.5 0.4 0.5     Recent Labs     08/16/23  0936 07/20/22  1452 01/21/22  1205 07/26/19  1001   WBC 8.9 10.8 9.3 9.4   HGB 13.3 13.9 13.8 14.2   HCT 42.7 45.1 44.5 46.7*    275 285 286   MCV 87 88 88 89     Recent Labs     01/21/22  1205   DDIMERVTE 600*     PTT - No results in last year.  _   _ _     Recent Labs     08/16/23  0936 07/20/22  1452 07/26/19  1001   CHOL 177 186 164   LDLF 122* 123* 108*   HDL 43.0 47.0 39.6*   TRIG 58 80 84     Lab Results   Component Value Date    HGBA1C 5.9 (A) 08/16/2023     Lab Results   Component Value Date    TSH 2.01 08/16/2023     Imaging  LUE DVT US 12/19/23   Right Upper Venous: The subclavian vein demonstrates a normal spontaneous and phasic flow.  Left Upper Venous: No evidence of acute deep vein thrombus visualized in the left upper extremity.     PVR TOS 12/19/23   Right Upper PVR: A decrease in PPG waveform amplitude was noted in the alternative position; patient laying down (supine) with arm relaxed at patients side on the bed.  Left Upper PVR: A decrease in PPG waveform amplitude was noted in the alternative position; patient laying down (supine) with arm relaxed at patients side on the bed.    Assessment/Plan   Marcelle Macdonald is a 43 y.o. female with past medical history of HTN and BRITNI, following with vascular medicine for left upper extremity pain and more prominent veins    _Arterial TOS noted at supine position with arm relaxed  _Left upper and lower extremity pain/discomfort associated with numbness and  tingling    Plan  PVRs is suggestive of arterial TOS and patient was referred to Dr. Salazar for further evaluation, but with complaint of left side discomfort associated with numbness and tingling since last visit, wonder if her symptoms are driven by underlying neuropathy.  She will be seeing her PCP soon for further evaluation/MRIs  We will order chest x-ray to rule out anatomical abnormalities  She will be seeing Dr. Salazar as well     Misty Ramírez MD

## 2024-02-12 NOTE — PROGRESS NOTES
Subjective   Patient ID: Marcelle Macdonald is a 43 y.o. female who presents for New Patient Visit.    new pt-amy-last pcp, gi, ob gyn, others?  PAP- SW DR Mauricio 2023  Vas- [address] Sandy Ramírez  seen 8am today due to left leg x1yr and left arm numbness since end of last yr; needs to keep testing-maybe mri lumbar;back pain noted-cleaning house worsened back pain; not exercising since last summer-out for break  Referred to vascular and chest xray    Dad and sis h/o blood clots    Any forms to fill out? no  last physical 3/15/23  last labs - 2023 [facility]  is pt fasting? no  Last tdap- 2018  last pap 6/23/20; due 6/2025  last mammo- 9/15/2023 SW gen  Jeison- using cpap nightly? Yes, helps daytime sleepiness    Bps at home-checks at work; 130s/80s  Any other questions or concerns that you want to discuss today? no    Family history form    Phq9=4, gad7=1      No care team member to display    HPI  Last labs-8/2023 vit d nl, tsh nl, hdl 43, ldl 122, cmp nl, cbc nl, A1c 5.9  Due for labs- none    Cholesterol   Date Value Ref Range Status   08/16/2023 177 0 - 199 mg/dL Final     Comment:     .      AGE      DESIRABLE   BORDERLINE HIGH   HIGH     0-19 Y     0 - 169       170 - 199     >/= 200    20-24 Y     0 - 189       190 - 224     >/= 225         >24 Y     0 - 199       200 - 239     >/= 240   **All ranges are based on fasting samples. Specific   therapeutic targets will vary based on patient-specific   cardiac risk.  .   Pediatric guidelines reference:Pediatrics 2011, 128(S5).   Adult guidelines reference: NCEP ATPIII Guidelines,     BEATRICE 2001, 258:2486-97  .   Venipuncture immediately after or during the    administration of Metamizole may lead to falsely   low results. Testing should be performed immediately   prior to Metamizole dosing.   07/20/2022 186 0 - 199 mg/dL Final     Comment:     .      AGE      DESIRABLE   BORDERLINE HIGH   HIGH     0-19 Y     0 - 169       170 - 199     >/= 200    20-24 Y     0 - 189        190 - 224     >/= 225         >24 Y     0 - 199       200 - 239     >/= 240   **All ranges are based on fasting samples. Specific   therapeutic targets will vary based on patient-specific   cardiac risk.  .   Pediatric guidelines reference:Pediatrics 2011, 128(S5).   Adult guidelines reference: NCEP ATPIII Guidelines,     BEATRICE 2001, 258:2486-97  .   Venipuncture immediately after or during the    administration of Metamizole may lead to falsely   low results. Testing should be performed immediately   prior to Metamizole dosing.     07/26/2019 164 0 - 199 mg/dL Final     Comment:     .      AGE      DESIRABLE   BORDERLINE HIGH   HIGH     0-19 Y     0 - 169       170 - 199     >/= 200    20-24 Y     0 - 189       190 - 224     >/= 225         >24 Y     0 - 199       200 - 239     >/= 240   **All ranges are based on fasting samples. Specific   therapeutic targets will vary based on patient-specific   cardiac risk.  .   Pediatric guidelines reference:Pediatrics 2011, 128(S5).   Adult guidelines reference: NCEP ATPIII Guidelines,     BEATRICE 2001, 258:2486-97  .   Venipuncture immediately after or during the    administration of Metamizole may lead to falsely   low results. Testing should be performed immediately   prior to Metamizole dosing.       Triglycerides   Date Value Ref Range Status   08/16/2023 58 0 - 149 mg/dL Final     Comment:     .      AGE      DESIRABLE   BORDERLINE HIGH   HIGH     VERY HIGH   0 D-90 D    19 - 174         ----         ----        ----  91 D- 9 Y     0 -  74        75 -  99     >/= 100      ----    10-19 Y     0 -  89        90 - 129     >/= 130      ----    20-24 Y     0 - 114       115 - 149     >/= 150      ----         >24 Y     0 - 149       150 - 199    200- 499    >/= 500  .   Venipuncture immediately after or during the    administration of Metamizole may lead to falsely   low results. Testing should be performed immediately   prior to Metamizole dosing.   07/20/2022 80 0 - 149 mg/dL  Final     Comment:     .      AGE      DESIRABLE   BORDERLINE HIGH   HIGH     VERY HIGH   0 D-90 D    19 - 174         ----         ----        ----  91 D- 9 Y     0 -  74        75 -  99     >/= 100      ----    10-19 Y     0 -  89        90 - 129     >/= 130      ----    20-24 Y     0 - 114       115 - 149     >/= 150      ----         >24 Y     0 - 149       150 - 199    200- 499    >/= 500  .   Venipuncture immediately after or during the    administration of Metamizole may lead to falsely   low results. Testing should be performed immediately   prior to Metamizole dosing.     07/26/2019 84 0 - 149 mg/dL Final     Comment:     .      AGE      DESIRABLE   BORDERLINE HIGH   HIGH     VERY HIGH   0 D-90 D    19 - 174         ----         ----        ----  91 D- 9 Y     0 -  74        75 -  99     >/= 100      ----    10-19 Y     0 -  89        90 - 129     >/= 130      ----    20-24 Y     0 - 114       115 - 149     >/= 150      ----         >24 Y     0 - 149       150 - 199    200- 499    >/= 500  .   Venipuncture immediately after or during the    administration of Metamizole may lead to falsely   low results. Testing should be performed immediately   prior to Metamizole dosing.       HDL   Date Value Ref Range Status   08/16/2023 43.0 mg/dL Final     Comment:     .      AGE      VERY LOW   LOW     NORMAL    HIGH       0-19 Y       < 35   < 40     40-45     ----    20-24 Y       ----   < 40       >45     ----      >24 Y       ----   < 40     40-60      >60  .   07/20/2022 47.0 mg/dL Final     Comment:     .      AGE      VERY LOW   LOW     NORMAL    HIGH       0-19 Y       < 35   < 40     40-45     ----    20-24 Y       ----   < 40       >45     ----      >24 Y       ----   < 40     40-60      >60  .     07/26/2019 39.6 (A) mg/dL Final     Comment:     .      AGE      VERY LOW   LOW     NORMAL    HIGH       0-19 Y       < 35   < 40     40-45     ----    20-24 Y       ----   < 40       >45     ----      >24 Y        "----   < 40     40-60      >60  .       No results found for: \"LDL\"  TSH   Date Value Ref Range Status   08/16/2023 2.01 0.44 - 3.98 mIU/L Final     Comment:      TSH testing is performed using different testing    methodology at [facility] than at other    [facility] hospitals. Direct result comparisons should    only be made within the same method.     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    last eye dr appt- 1/2024  No vision issues    last dental appt- has appt next wk    BMs-regular  Sleep-able to fall asleep but hard to stay asleep-up at 6a-light sleeper; no snoring or apnea  no cp, swelling, sob, abd pain, n/v/d/c, blood in stool or black stools  STI testing including hiv (age 15-65) and hep c screening (18-79)-declines        Immunization History   Administered Date(s) Administered    Flu vaccine (IIV4), preservative free *Check age/dose* 09/27/2023    Influenza, seasonal, injectable 11/19/2021    Moderna SARS-CoV-2 Vaccination 12/10/2021    PPD Test 05/31/2016, 08/14/2018, 08/28/2018, 07/26/2019    Pfizer Purple Cap SARS-CoV-2 03/22/2021, 04/12/2021    Tdap " vaccine, age 7 year and older (BOOSTRIX, ADACEL) 02/21/2007, 06/20/2018             fractures in lifetime-lf arm  Anyone with osteoporosis in the family-none    FH heart attack, heart surgery-pgf-age 35 mi; dad-heart issues-on coumadin  FH stroke-none    The ASCVD Risk score (Nestor CHURCH, et al., 2019) failed to calculate for the following reasons:    Unable to determine if patient is Non-   Coronary Artery Calcium score:  This test is recommended for men 45 or older and women 55 or older without a history of heart disease and have 1 risk factor (high LDL cholesterol, low HDL cholesterol, high blood pressure, smoker (current or past), type 2 diabetes, IBD, lupus, RA, ankylosing spondylitis, psoriasis or family history of  heart disease <55yrs in dad, brother or child or <65yrs in mom, sister, or child.)       Review of Systems   Constitutional:  Negative for appetite change, chills, fatigue, fever and unexpected weight change.   HENT:  Negative for congestion, ear pain, sore throat and trouble swallowing.    Eyes:  Negative for pain, discharge and redness.   Respiratory:  Negative for cough and shortness of breath.    Cardiovascular:  Negative for chest pain and palpitations.   Gastrointestinal:  Negative for abdominal pain, blood in stool and vomiting.   Endocrine: Negative for polydipsia, polyphagia and polyuria.   Genitourinary:  Negative for dysuria, frequency, hematuria and urgency.   Musculoskeletal:  Negative for back pain and neck pain.   Skin:  Negative for rash and wound.   Allergic/Immunologic: Negative for immunocompromised state.   Neurological:  Negative for dizziness, syncope and headaches.   Hematological:  Negative for adenopathy. Does not bruise/bleed easily.   Psychiatric/Behavioral:  Negative for confusion and hallucinations.        Objective   Visit Vitals  /82   Pulse 65   Temp 36.5 °C (97.7 °F)      BP Readings from Last 3 Encounters:   02/12/24 168/82   02/12/24  140/84   12/18/23 122/80     Wt Readings from Last 3 Encounters:   02/12/24 117 kg (258 lb)   02/12/24 115 kg (254 lb)   12/18/23 116 kg (255 lb 1.6 oz)           Physical Exam  Constitutional:       General: She is not in acute distress.     Appearance: Normal appearance. She is not ill-appearing.   HENT:      Head: Normocephalic.      Right Ear: Tympanic membrane, ear canal and external ear normal.      Left Ear: Tympanic membrane, ear canal and external ear normal.      Nose: Nose normal.      Mouth/Throat:      Mouth: Mucous membranes are moist.      Pharynx: Oropharynx is clear.   Eyes:      Extraocular Movements: Extraocular movements intact.      Conjunctiva/sclera: Conjunctivae normal.      Pupils: Pupils are equal, round, and reactive to light.   Cardiovascular:      Rate and Rhythm: Normal rate and regular rhythm.      Heart sounds: Normal heart sounds. No murmur heard.  Pulmonary:      Effort: Pulmonary effort is normal. No respiratory distress.      Breath sounds: Normal breath sounds. No wheezing, rhonchi or rales.   Abdominal:      General: Bowel sounds are normal.      Palpations: Abdomen is soft. There is no mass.      Tenderness: There is no abdominal tenderness.   Musculoskeletal:         General: No swelling or tenderness. Normal range of motion.      Cervical back: Normal range of motion and neck supple.      Right lower leg: No edema.      Left lower leg: No edema.   Skin:     General: Skin is warm.      Findings: No rash.   Neurological:      General: No focal deficit present.      Mental Status: She is alert and oriented to person, place, and time.      Cranial Nerves: No cranial nerve deficit.      Motor: No weakness.   Psychiatric:         Mood and Affect: Mood normal.         Behavior: Behavior normal.       Assessment/Plan   Diagnoses and all orders for this visit:  Left arm pain  -     ECG 12 Lead  BMI 38.0-38.9,adult  Left arm numbness  -     EMG & nerve conduction; Future  Left leg  numbness  -     EMG & nerve conduction; Future  Hypertension, unspecified type  -     CT cardiac scoring wo IV contrast; Future  Prediabetes  -     POCT glycosylated hemoglobin (Hb A1C) manually resulted

## 2024-02-12 NOTE — PATIENT INSTRUCTIONS
See vascular dr    Check bp daily  Goal <140/<90  I will message you in 1 wk to obtain bps    Do nerve conduction testing lf arm and lf leg  Consider PT and mri lumbar  EKG today    A1C today=5.9  This is the 3 month average of your sugars.  You are in the normal range which is 5.6 or less.    Set up ct cardiac score    Return in 3mon for wellness      I will communicate with you via [email or web address] regarding messages and results. If you need help with this, you can call the support line at [phone].    IT WAS A PLEASURE TO SEE YOU TODAY. THANK YOU FOR CHOOSING US FOR YOUR HEALTHCARE NEEDS.

## 2024-03-08 ENCOUNTER — APPOINTMENT (OUTPATIENT)
Dept: PRIMARY CARE | Facility: CLINIC | Age: 44
End: 2024-03-08
Payer: COMMERCIAL

## 2024-03-25 ENCOUNTER — APPOINTMENT (OUTPATIENT)
Dept: CARDIOLOGY | Facility: CLINIC | Age: 44
End: 2024-03-25
Payer: COMMERCIAL

## 2024-03-27 ENCOUNTER — HOSPITAL ENCOUNTER (OUTPATIENT)
Dept: RADIOLOGY | Facility: CLINIC | Age: 44
Discharge: HOME | End: 2024-03-27
Payer: COMMERCIAL

## 2024-03-27 DIAGNOSIS — I10 HYPERTENSION, UNSPECIFIED TYPE: ICD-10-CM

## 2024-03-27 PROCEDURE — 75571 CT HRT W/O DYE W/CA TEST: CPT

## 2024-04-15 ENCOUNTER — APPOINTMENT (OUTPATIENT)
Dept: NEUROLOGY | Facility: HOSPITAL | Age: 44
End: 2024-04-15
Payer: COMMERCIAL

## 2024-04-17 ENCOUNTER — OFFICE VISIT (OUTPATIENT)
Dept: URGENT CARE | Facility: CLINIC | Age: 44
End: 2024-04-17
Payer: COMMERCIAL

## 2024-04-17 VITALS
BODY MASS INDEX: 38.4 KG/M2 | DIASTOLIC BLOOD PRESSURE: 81 MMHG | RESPIRATION RATE: 18 BRPM | HEART RATE: 79 BPM | OXYGEN SATURATION: 97 % | SYSTOLIC BLOOD PRESSURE: 122 MMHG | TEMPERATURE: 98.8 F | WEIGHT: 260 LBS

## 2024-04-17 DIAGNOSIS — J02.9 SORE THROAT: Primary | ICD-10-CM

## 2024-04-17 DIAGNOSIS — J02.9 ACUTE PHARYNGITIS, UNSPECIFIED ETIOLOGY: ICD-10-CM

## 2024-04-17 LAB — POC RAPID STREP: NEGATIVE

## 2024-04-17 PROCEDURE — 3008F BODY MASS INDEX DOCD: CPT | Performed by: PHYSICIAN ASSISTANT

## 2024-04-17 PROCEDURE — 87880 STREP A ASSAY W/OPTIC: CPT | Performed by: PHYSICIAN ASSISTANT

## 2024-04-17 PROCEDURE — 99203 OFFICE O/P NEW LOW 30 MIN: CPT | Performed by: PHYSICIAN ASSISTANT

## 2024-04-17 PROCEDURE — 1036F TOBACCO NON-USER: CPT | Performed by: PHYSICIAN ASSISTANT

## 2024-04-17 PROCEDURE — 3074F SYST BP LT 130 MM HG: CPT | Performed by: PHYSICIAN ASSISTANT

## 2024-04-17 PROCEDURE — 3079F DIAST BP 80-89 MM HG: CPT | Performed by: PHYSICIAN ASSISTANT

## 2024-04-17 RX ORDER — PREDNISONE 20 MG/1
20 TABLET ORAL 2 TIMES DAILY
Qty: 6 TABLET | Refills: 0 | Status: SHIPPED | OUTPATIENT
Start: 2024-04-17 | End: 2024-04-20

## 2024-04-17 RX ORDER — ERGOCALCIFEROL 1.25 MG/1
CAPSULE ORAL
COMMUNITY
Start: 2019-07-27 | End: 2024-04-24 | Stop reason: ALTCHOICE

## 2024-04-17 ASSESSMENT — ENCOUNTER SYMPTOMS: SORE THROAT: 1

## 2024-04-17 NOTE — PROGRESS NOTES
Subjective   Patient ID: Marcelle Macdonald is a 44 y.o. female.    Patient is a 44-year-old female who complains of sore throat that she has been experiencing for the past 1 day.  Patient denies congestion, sinus pressure, ear pain, cough or other illness symptoms.  Patient reports no fever, chills or myalgia.  Patient states other family members at home are asymptomatic and in good health.      Sore Throat     The following portions of the chart were reviewed this encounter and updated as appropriate:       Review of Systems   HENT:  Positive for sore throat.    All other systems reviewed and are negative.  Objective   Physical Exam  Vitals and nursing note reviewed.   Constitutional:       Appearance: Normal appearance. She is normal weight.   HENT:      Head: Normocephalic and atraumatic.      Right Ear: Tympanic membrane, ear canal and external ear normal.      Left Ear: Tympanic membrane, ear canal and external ear normal.      Nose: Nose normal. No congestion or rhinorrhea.      Mouth/Throat:      Mouth: Mucous membranes are moist.      Pharynx: Oropharynx is clear. No oropharyngeal exudate or posterior oropharyngeal erythema.   Eyes:      Extraocular Movements: Extraocular movements intact.      Conjunctiva/sclera: Conjunctivae normal.      Pupils: Pupils are equal, round, and reactive to light.   Cardiovascular:      Rate and Rhythm: Normal rate and regular rhythm.      Pulses: Normal pulses.      Heart sounds: Normal heart sounds.   Pulmonary:      Effort: Pulmonary effort is normal. No respiratory distress.      Breath sounds: Normal breath sounds. No stridor. No wheezing, rhonchi or rales.   Musculoskeletal:      Cervical back: Normal range of motion and neck supple.   Skin:     General: Skin is warm and dry.      Capillary Refill: Capillary refill takes less than 2 seconds.   Neurological:      General: No focal deficit present.      Mental Status: She is alert and oriented to person, place, and time.    Psychiatric:         Mood and Affect: Mood normal.         Behavior: Behavior normal.         Thought Content: Thought content normal.         Judgment: Judgment normal.     Assessment/Plan   Unremarkable physical exam findings as noted above.  Rapid strep test is negative.  Patient was provided with a prescription for prednisone 20 mg and supportive care instructions were discussed.  Patient verbalizes clear understanding of same.    CLINICAL IMPRESSION:  Acute Pharyngitis    Diagnoses and all orders for this visit:  Sore throat  -     POCT rapid strep A manually resulted  Acute pharyngitis, unspecified etiology  -     predniSONE (Deltasone) 20 mg tablet; Take 1 tablet (20 mg) by mouth 2 times a day for 3 days.    Patient disposition: Home

## 2024-04-24 ENCOUNTER — OFFICE VISIT (OUTPATIENT)
Dept: PRIMARY CARE | Facility: CLINIC | Age: 44
End: 2024-04-24
Payer: COMMERCIAL

## 2024-04-24 VITALS
HEART RATE: 83 BPM | SYSTOLIC BLOOD PRESSURE: 153 MMHG | TEMPERATURE: 97.5 F | WEIGHT: 259 LBS | HEIGHT: 69 IN | OXYGEN SATURATION: 96 % | DIASTOLIC BLOOD PRESSURE: 92 MMHG | BODY MASS INDEX: 38.36 KG/M2

## 2024-04-24 DIAGNOSIS — J01.00 ACUTE NON-RECURRENT MAXILLARY SINUSITIS: ICD-10-CM

## 2024-04-24 DIAGNOSIS — J02.9 ACUTE PHARYNGITIS, UNSPECIFIED ETIOLOGY: Primary | ICD-10-CM

## 2024-04-24 PROCEDURE — 1036F TOBACCO NON-USER: CPT | Performed by: NURSE PRACTITIONER

## 2024-04-24 PROCEDURE — 3008F BODY MASS INDEX DOCD: CPT | Performed by: NURSE PRACTITIONER

## 2024-04-24 PROCEDURE — 3080F DIAST BP >= 90 MM HG: CPT | Performed by: NURSE PRACTITIONER

## 2024-04-24 PROCEDURE — 3077F SYST BP >= 140 MM HG: CPT | Performed by: NURSE PRACTITIONER

## 2024-04-24 PROCEDURE — 99213 OFFICE O/P EST LOW 20 MIN: CPT | Performed by: NURSE PRACTITIONER

## 2024-04-24 RX ORDER — DOXYCYCLINE 100 MG/1
100 CAPSULE ORAL 2 TIMES DAILY
Qty: 20 CAPSULE | Refills: 0 | Status: SHIPPED | OUTPATIENT
Start: 2024-04-24 | End: 2024-05-10 | Stop reason: ALTCHOICE

## 2024-04-24 ASSESSMENT — ENCOUNTER SYMPTOMS
FATIGUE: 0
ABDOMINAL PAIN: 0
VOMITING: 0
PALPITATIONS: 0
FACIAL SWELLING: 0
SINUS PRESSURE: 1
EYE REDNESS: 0
SHORTNESS OF BREATH: 0
CHEST TIGHTNESS: 0
MYALGIAS: 0
CHILLS: 1
SORE THROAT: 1
FEVER: 0
EYE DISCHARGE: 0
VOICE CHANGE: 1
ARTHRALGIAS: 0
HEADACHES: 1
WHEEZING: 0
DIARRHEA: 0
RHINORRHEA: 0
COUGH: 1

## 2024-04-24 ASSESSMENT — PATIENT HEALTH QUESTIONNAIRE - PHQ9
2. FEELING DOWN, DEPRESSED OR HOPELESS: NOT AT ALL
SUM OF ALL RESPONSES TO PHQ9 QUESTIONS 1 AND 2: 0
1. LITTLE INTEREST OR PLEASURE IN DOING THINGS: NOT AT ALL

## 2024-04-24 NOTE — PROGRESS NOTES
Subjective   Patient ID: Marcelle Macdonald is a 44 y.o. female who presents for Sore Throat.  Last physical: appt may for cpe    Fasting? no  Labs-did not do yet  Bps at home-  140/90 good days 130/70  current sx- sore throat in lower part of throat/very upper chest, headache, deep sinus pain.   when did sx start- around 9 days , went to urgent care on Wednesday tested for strep and was negative . Got prednisone  did pt take a covid-19 test? Wednesday negative   if yes when?      HPI  HA, sore throat in lower part of throat/very upper chest, deep rt sinus pain, chills , feels watery in ears x 9 days; went to urgent care 1wk ago tested for strep and was negative . Got prednisone  Lost voice yesterday  Sl cough at night    Covid test done at home 7d ago and neg    no sob, wheezing, tight upper chest, fever,  muscle/jt pain,  new loss of taste or smell, diarrhea, vomiting, nausea, abdominal pain, fatigue, weakness, red eye, rash, bruising, cyanosis, ear pain, ear pressure, runny nose, stuffy nose, post nasal drip, pain with deep breath, leg or foot swelling, calf pain  loss of appetite-none  fluids-yes  has urinated at least 3 times in 24hrs  Seasonal allergies-none  Selftxt-vicks bottom of feet    No known exposure to COVID-19  No known exposure to strep  No known exposure to influenza  No one sick around the pt      Risk factors:  Chronic disease/comorbidities: htn,shukri  not a healthcare worker  Age: not 65yrs of age and older      No care team member to display     Review of Systems   Constitutional:  Positive for chills. Negative for fatigue and fever.   HENT:  Positive for sinus pressure, sore throat and voice change. Negative for congestion, ear discharge, ear pain, facial swelling, postnasal drip and rhinorrhea.    Eyes:  Negative for discharge and redness.   Respiratory:  Positive for cough. Negative for chest tightness, shortness of breath and wheezing.    Cardiovascular:  Negative for chest pain, palpitations  and leg swelling.   Gastrointestinal:  Negative for abdominal pain, diarrhea and vomiting.   Musculoskeletal:  Negative for arthralgias and myalgias.   Skin:  Negative for rash.   Neurological:  Positive for headaches.       Objective   Visit Vitals  BP (!) 153/92   Pulse 83   Temp 36.4 °C (97.5 °F)      BP Readings from Last 3 Encounters:   04/24/24 (!) 153/92   04/17/24 122/81   02/12/24 143/83     Wt Readings from Last 3 Encounters:   04/24/24 117 kg (259 lb)   04/17/24 118 kg (260 lb)   02/12/24 117 kg (258 lb)       Physical Exam  Constitutional:       Appearance: Normal appearance.   HENT:      Head: Normocephalic.      Right Ear: Tympanic membrane, ear canal and external ear normal.      Left Ear: Tympanic membrane, ear canal and external ear normal.      Nose: Nose normal.      Mouth/Throat:      Mouth: Mucous membranes are moist.      Pharynx: No oropharyngeal exudate or posterior oropharyngeal erythema.   Cardiovascular:      Rate and Rhythm: Normal rate and regular rhythm.      Heart sounds: Normal heart sounds.   Pulmonary:      Effort: Pulmonary effort is normal.      Breath sounds: Normal breath sounds. No wheezing, rhonchi or rales.   Lymphadenopathy:      Cervical: No cervical adenopathy.   Neurological:      Mental Status: She is alert.         Assessment/Plan   Diagnoses and all orders for this visit:  Acute pharyngitis, unspecified etiology  Acute non-recurrent maxillary sinusitis  -     doxycycline (Vibramycin) 100 mg capsule; Take 1 capsule (100 mg) by mouth 2 times a day for 10 days. Take with at least 8 ounces (large glass) of water, do not lie down for 30 minutes after

## 2024-04-24 NOTE — PATIENT INSTRUCTIONS
Doxycycline  Zyrtec 1 a day x 2wks otc  Fluids  Rest  Call if sx worsen or change or not starting to get better in 2-3d    Keep may appt    I will communicate with you via [employer] regarding messages and results. If you need help with this, you can call the support line at [phone].    IT WAS A PLEASURE TO SEE YOU TODAY. THANK YOU FOR CHOOSING US FOR YOUR HEALTHCARE NEEDS.

## 2024-05-01 ENCOUNTER — HOSPITAL ENCOUNTER (OUTPATIENT)
Dept: NEUROLOGY | Facility: HOSPITAL | Age: 44
Discharge: HOME | End: 2024-05-01
Payer: COMMERCIAL

## 2024-05-01 DIAGNOSIS — M54.42 ACUTE LEFT-SIDED LOW BACK PAIN WITH LEFT-SIDED SCIATICA: ICD-10-CM

## 2024-05-01 DIAGNOSIS — R20.0 LEFT LEG NUMBNESS: ICD-10-CM

## 2024-05-01 DIAGNOSIS — M79.602 LEFT ARM PAIN: Primary | ICD-10-CM

## 2024-05-01 DIAGNOSIS — R20.0 LEFT ARM NUMBNESS: ICD-10-CM

## 2024-05-01 PROCEDURE — 95911 NRV CNDJ TEST 9-10 STUDIES: CPT | Performed by: STUDENT IN AN ORGANIZED HEALTH CARE EDUCATION/TRAINING PROGRAM

## 2024-05-01 PROCEDURE — 95886 MUSC TEST DONE W/N TEST COMP: CPT | Performed by: STUDENT IN AN ORGANIZED HEALTH CARE EDUCATION/TRAINING PROGRAM

## 2024-05-01 PROCEDURE — 95885 MUSC TST DONE W/NERV TST LIM: CPT | Mod: 59,GC | Performed by: STUDENT IN AN ORGANIZED HEALTH CARE EDUCATION/TRAINING PROGRAM

## 2024-05-01 PROCEDURE — 95886 MUSC TEST DONE W/N TEST COMP: CPT | Mod: GC | Performed by: STUDENT IN AN ORGANIZED HEALTH CARE EDUCATION/TRAINING PROGRAM

## 2024-05-01 PROCEDURE — 95885 MUSC TST DONE W/NERV TST LIM: CPT | Performed by: STUDENT IN AN ORGANIZED HEALTH CARE EDUCATION/TRAINING PROGRAM

## 2024-05-10 PROBLEM — J02.9 ACUTE PHARYNGITIS: Status: RESOLVED | Noted: 2024-04-17 | Resolved: 2024-05-10

## 2024-05-10 PROBLEM — N64.52 BLOODY DISCHARGE FROM NIPPLE: Status: RESOLVED | Noted: 2023-03-13 | Resolved: 2024-05-10

## 2024-05-10 ASSESSMENT — ENCOUNTER SYMPTOMS
HEADACHES: 0
HEMATURIA: 0
BRUISES/BLEEDS EASILY: 0
FEVER: 0
BLOOD IN STOOL: 0
APPETITE CHANGE: 0
SORE THROAT: 0
EYE DISCHARGE: 0
NECK PAIN: 0
CHILLS: 0
UNEXPECTED WEIGHT CHANGE: 0
FREQUENCY: 0
FATIGUE: 0
POLYDIPSIA: 0
WOUND: 0
POLYPHAGIA: 0
DYSURIA: 0
EYE PAIN: 0
PALPITATIONS: 0
CONFUSION: 0
HALLUCINATIONS: 0
VOMITING: 0
COUGH: 0
EYE REDNESS: 0
BACK PAIN: 0
ABDOMINAL PAIN: 0
DIZZINESS: 0
SHORTNESS OF BREATH: 0
ADENOPATHY: 0
TROUBLE SWALLOWING: 0

## 2024-05-10 NOTE — PROGRESS NOTES
Subjective   Patient ID: Marcelle Macdonald is a 44 y.o. female who presents for Annual Exam (Pt presents today for annual exam ).      Is pt fasting? Yes   Does pt see any providers other than care team below:   javed Aguayo bennett, paul    Any forms to fill out? No   Did she do the labs I ordered in feb? No   using cpap nightly? Yes   Bps at home- yes  <140/<90  Any other questions or concerns that pt wants to discuss today? No     Poc A1c= 5.8  Phq9=3   , gad7=3        No care team member to display    HPI  Last labs-2/2024 A1c 5.7  8/2023 vit d nl, tsh nl, ldl 122, hdl 43, cbc nl, cmp nl  Due for labs- print orders    Cholesterol   Date Value Ref Range Status   08/16/2023 177 0 - 199 mg/dL Final     Comment:     .      AGE      DESIRABLE   BORDERLINE HIGH   HIGH     0-19 Y     0 - 169       170 - 199     >/= 200    20-24 Y     0 - 189       190 - 224     >/= 225         >24 Y     0 - 199       200 - 239     >/= 240   **All ranges are based on fasting samples. Specific   therapeutic targets will vary based on patient-specific   cardiac risk.  .   Pediatric guidelines reference:Pediatrics 2011, 128(S5).   Adult guidelines reference: NCEP ATPIII Guidelines,     BEATRICE 2001, 258:2486-97  .   Venipuncture immediately after or during the    administration of Metamizole may lead to falsely   low results. Testing should be performed immediately   prior to Metamizole dosing.   07/20/2022 186 0 - 199 mg/dL Final     Comment:     .      AGE      DESIRABLE   BORDERLINE HIGH   HIGH     0-19 Y     0 - 169       170 - 199     >/= 200    20-24 Y     0 - 189       190 - 224     >/= 225         >24 Y     0 - 199       200 - 239     >/= 240   **All ranges are based on fasting samples. Specific   therapeutic targets will vary based on patient-specific   cardiac risk.  .   Pediatric guidelines reference:Pediatrics 2011, 128(S5).   Adult guidelines reference: NCEP ATPIII Guidelines,     BEATRICE 2001, 258:2486-97  .   Venipuncture  immediately after or during the    administration of Metamizole may lead to falsely   low results. Testing should be performed immediately   prior to Metamizole dosing.     07/26/2019 164 0 - 199 mg/dL Final     Comment:     .      AGE      DESIRABLE   BORDERLINE HIGH   HIGH     0-19 Y     0 - 169       170 - 199     >/= 200    20-24 Y     0 - 189       190 - 224     >/= 225         >24 Y     0 - 199       200 - 239     >/= 240   **All ranges are based on fasting samples. Specific   therapeutic targets will vary based on patient-specific   cardiac risk.  .   Pediatric guidelines reference:Pediatrics 2011, 128(S5).   Adult guidelines reference: NCEP ATPIII Guidelines,     BEATRICE 2001, 258:2486-97  .   Venipuncture immediately after or during the    administration of Metamizole may lead to falsely   low results. Testing should be performed immediately   prior to Metamizole dosing.       Triglycerides   Date Value Ref Range Status   08/16/2023 58 0 - 149 mg/dL Final     Comment:     .      AGE      DESIRABLE   BORDERLINE HIGH   HIGH     VERY HIGH   0 D-90 D    19 - 174         ----         ----        ----  91 D- 9 Y     0 -  74        75 -  99     >/= 100      ----    10-19 Y     0 -  89        90 - 129     >/= 130      ----    20-24 Y     0 - 114       115 - 149     >/= 150      ----         >24 Y     0 - 149       150 - 199    200- 499    >/= 500  .   Venipuncture immediately after or during the    administration of Metamizole may lead to falsely   low results. Testing should be performed immediately   prior to Metamizole dosing.   07/20/2022 80 0 - 149 mg/dL Final     Comment:     .      AGE      DESIRABLE   BORDERLINE HIGH   HIGH     VERY HIGH   0 D-90 D    19 - 174         ----         ----        ----  91 D- 9 Y     0 -  74        75 -  99     >/= 100      ----    10-19 Y     0 -  89        90 - 129     >/= 130      ----    20-24 Y     0 - 114       115 - 149     >/= 150      ----         >24 Y     0 - 149        "150 - 199    200- 499    >/= 500  .   Venipuncture immediately after or during the    administration of Metamizole may lead to falsely   low results. Testing should be performed immediately   prior to Metamizole dosing.     07/26/2019 84 0 - 149 mg/dL Final     Comment:     .      AGE      DESIRABLE   BORDERLINE HIGH   HIGH     VERY HIGH   0 D-90 D    19 - 174         ----         ----        ----  91 D- 9 Y     0 -  74        75 -  99     >/= 100      ----    10-19 Y     0 -  89        90 - 129     >/= 130      ----    20-24 Y     0 - 114       115 - 149     >/= 150      ----         >24 Y     0 - 149       150 - 199    200- 499    >/= 500  .   Venipuncture immediately after or during the    administration of Metamizole may lead to falsely   low results. Testing should be performed immediately   prior to Metamizole dosing.       HDL   Date Value Ref Range Status   08/16/2023 43.0 mg/dL Final     Comment:     .      AGE      VERY LOW   LOW     NORMAL    HIGH       0-19 Y       < 35   < 40     40-45     ----    20-24 Y       ----   < 40       >45     ----      >24 Y       ----   < 40     40-60      >60  .   07/20/2022 47.0 mg/dL Final     Comment:     .      AGE      VERY LOW   LOW     NORMAL    HIGH       0-19 Y       < 35   < 40     40-45     ----    20-24 Y       ----   < 40       >45     ----      >24 Y       ----   < 40     40-60      >60  .     07/26/2019 39.6 (A) mg/dL Final     Comment:     .      AGE      VERY LOW   LOW     NORMAL    HIGH       0-19 Y       < 35   < 40     40-45     ----    20-24 Y       ----   < 40       >45     ----      >24 Y       ----   < 40     40-60      >60  .       No results found for: \"LDL\"  TSH   Date Value Ref Range Status   08/16/2023 2.01 0.44 - 3.98 mIU/L Final     Comment:      TSH testing is performed using different testing    methodology at [facility] than at other    [facility]. Direct result comparisons should    only be made within the same method. " "    No results found for: \"A1C\"  No components found for: \"POCA1C\"  No results found for: \"ALBUR\"  No components found for: \"POCALBUR\"      Other concerns:  agitated, hot flashes  Back pain only on off        ER/urgicare visits in the last year- throat  Hospitalizations in the last year- none      last Pap- 6/23/20; due 6/2025  H/o abn pap-remote    FH ovarian, cervical, uterine ca-none    Current birth control method-mirena  No change in contraception desired        last mammo- (40-75/90) 9/15/2023 SW gen    FH br ca-patgm      FH colon ca-none      Exercise- more active outside in summer  Diet-3 meals a day   Body mass index is 38.54 kg/m².    last eye dr appt- contacts; 1/2024  No vision issues    last dental appt- 4/2024    BMs-regular  Sleep-able to fall asleep and stay asleep; no snoring or apnea; uses cpap nightly  no cp, swelling, sob, abd pain, n/v/d/c, blood in stool or black stools  STI testing including hiv (age 15-65) and hep c screening (18-79)-declines        Immunization History   Administered Date(s) Administered    Flu vaccine (IIV4), preservative free *Check age/dose* 09/27/2023    Influenza, seasonal, injectable 11/19/2021    Moderna SARS-CoV-2 Vaccination 12/10/2021    PPD Test 05/31/2016, 08/14/2018, 08/28/2018, 07/26/2019    Pfizer Purple Cap SARS-CoV-2 03/22/2021, 04/12/2021    Tdap vaccine, age 7 year and older (BOOSTRIX, ADACEL) 02/21/2007, 06/20/2018           fractures in lifetime-lf arm  Anyone with osteoporosis in the family-none    FH heart attack, heart surgery-dad-chf, afib; pgf-mi  FH stroke-none    The ASCVD Risk score (Nestor CHURCH, et al., 2019) failed to calculate for the following reasons:    Unable to determine if patient is Non-   Coronary Artery Calcium score:  This test is recommended for men 45 or older and women 55 or older without a history of heart disease and have 1 risk factor (high LDL cholesterol, low HDL cholesterol, high blood pressure, smoker " (current or past), type 2 diabetes, IBD, lupus, RA, ankylosing spondylitis, psoriasis or family history of  heart disease <55yrs in dad, brother or child or <65yrs in mom, sister, or child.)       Review of Systems   Constitutional:  Negative for appetite change, chills, fatigue, fever and unexpected weight change.   HENT:  Negative for congestion, ear pain, sore throat and trouble swallowing.    Eyes:  Negative for pain, discharge and redness.   Respiratory:  Negative for cough and shortness of breath.    Cardiovascular:  Negative for chest pain and palpitations.   Gastrointestinal:  Negative for abdominal pain, blood in stool and vomiting.   Endocrine: Negative for polydipsia, polyphagia and polyuria.   Genitourinary:  Negative for dysuria, frequency, hematuria and urgency.   Musculoskeletal:  Negative for back pain and neck pain.   Skin:  Negative for rash and wound.   Allergic/Immunologic: Negative for immunocompromised state.   Neurological:  Negative for dizziness, syncope and headaches.   Hematological:  Negative for adenopathy. Does not bruise/bleed easily.   Psychiatric/Behavioral:  Negative for confusion and hallucinations.        Objective   Visit Vitals  /77   Pulse 60   Temp 36.4 °C (97.5 °F)      BP Readings from Last 3 Encounters:   05/13/24 131/77   04/24/24 (!) 153/92   04/17/24 122/81     Wt Readings from Last 3 Encounters:   05/13/24 118 kg (261 lb)   04/24/24 117 kg (259 lb)   04/17/24 118 kg (260 lb)           Physical Exam  Constitutional:       General: She is not in acute distress.     Appearance: Normal appearance. She is not ill-appearing.   HENT:      Head: Normocephalic.      Right Ear: Tympanic membrane, ear canal and external ear normal.      Left Ear: Tympanic membrane, ear canal and external ear normal.      Nose: Nose normal.      Mouth/Throat:      Mouth: Mucous membranes are moist.      Pharynx: Oropharynx is clear.   Eyes:      Extraocular Movements: Extraocular movements  intact.      Conjunctiva/sclera: Conjunctivae normal.      Pupils: Pupils are equal, round, and reactive to light.   Cardiovascular:      Rate and Rhythm: Normal rate and regular rhythm.      Heart sounds: Normal heart sounds. No murmur heard.  Pulmonary:      Effort: Pulmonary effort is normal. No respiratory distress.      Breath sounds: Normal breath sounds. No wheezing, rhonchi or rales.   Abdominal:      General: Bowel sounds are normal.      Palpations: Abdomen is soft. There is no mass.      Tenderness: There is no abdominal tenderness.   Musculoskeletal:         General: No swelling or tenderness. Normal range of motion.      Cervical back: Normal range of motion and neck supple.      Right lower leg: No edema.      Left lower leg: No edema.   Skin:     General: Skin is warm.      Findings: No rash.   Neurological:      General: No focal deficit present.      Mental Status: She is alert and oriented to person, place, and time.      Cranial Nerves: No cranial nerve deficit.      Motor: No weakness.   Psychiatric:         Mood and Affect: Mood normal.         Behavior: Behavior normal.         Assessment/Plan   Diagnoses and all orders for this visit:  Routine general medical examination at a health care facility  Prediabetes  -     POCT glycosylated hemoglobin (Hb A1C) manually resulted  Hypertension, unspecified type  Obstructive sleep apnea, adult  Hot flashes  BMI 38.0-38.9,adult  Class 2 severe obesity due to excess calories with serious comorbidity and body mass index (BMI) of 38.0 to 38.9 in adult (Multi)  Screening for tuberculosis  -     T-Spot TB; Future  Other orders  -     Follow Up In Primary Care - Health Maintenance

## 2024-05-13 ENCOUNTER — LAB (OUTPATIENT)
Dept: LAB | Facility: LAB | Age: 44
End: 2024-05-13
Payer: COMMERCIAL

## 2024-05-13 ENCOUNTER — OFFICE VISIT (OUTPATIENT)
Dept: PRIMARY CARE | Facility: CLINIC | Age: 44
End: 2024-05-13
Payer: COMMERCIAL

## 2024-05-13 VITALS
SYSTOLIC BLOOD PRESSURE: 131 MMHG | HEIGHT: 69 IN | OXYGEN SATURATION: 97 % | DIASTOLIC BLOOD PRESSURE: 77 MMHG | WEIGHT: 261 LBS | TEMPERATURE: 97.5 F | BODY MASS INDEX: 38.66 KG/M2 | HEART RATE: 60 BPM

## 2024-05-13 DIAGNOSIS — R73.03 PREDIABETES: ICD-10-CM

## 2024-05-13 DIAGNOSIS — G47.33 OBSTRUCTIVE SLEEP APNEA, ADULT: ICD-10-CM

## 2024-05-13 DIAGNOSIS — I10 HYPERTENSION, UNSPECIFIED TYPE: ICD-10-CM

## 2024-05-13 DIAGNOSIS — Z11.1 SCREENING FOR TUBERCULOSIS: ICD-10-CM

## 2024-05-13 DIAGNOSIS — Z00.00 ROUTINE GENERAL MEDICAL EXAMINATION AT A HEALTH CARE FACILITY: ICD-10-CM

## 2024-05-13 DIAGNOSIS — Z00.00 ROUTINE GENERAL MEDICAL EXAMINATION AT A HEALTH CARE FACILITY: Primary | ICD-10-CM

## 2024-05-13 DIAGNOSIS — E66.01 CLASS 2 SEVERE OBESITY DUE TO EXCESS CALORIES WITH SERIOUS COMORBIDITY AND BODY MASS INDEX (BMI) OF 38.0 TO 38.9 IN ADULT (MULTI): ICD-10-CM

## 2024-05-13 LAB
ALBUMIN SERPL BCP-MCNC: 4.2 G/DL (ref 3.4–5)
ALP SERPL-CCNC: 71 U/L (ref 33–110)
ALT SERPL W P-5'-P-CCNC: 14 U/L (ref 7–45)
ANION GAP SERPL CALC-SCNC: 15 MMOL/L (ref 10–20)
AST SERPL W P-5'-P-CCNC: 14 U/L (ref 9–39)
BASOPHILS # BLD AUTO: 0.05 X10*3/UL (ref 0–0.1)
BASOPHILS NFR BLD AUTO: 0.5 %
BILIRUB SERPL-MCNC: 0.6 MG/DL (ref 0–1.2)
BUN SERPL-MCNC: 10 MG/DL (ref 6–23)
CALCIUM SERPL-MCNC: 9.2 MG/DL (ref 8.6–10.6)
CHLORIDE SERPL-SCNC: 102 MMOL/L (ref 98–107)
CHOLEST SERPL-MCNC: 183 MG/DL (ref 0–199)
CHOLESTEROL/HDL RATIO: 4.5
CO2 SERPL-SCNC: 26 MMOL/L (ref 21–32)
CREAT SERPL-MCNC: 0.71 MG/DL (ref 0.5–1.05)
EGFRCR SERPLBLD CKD-EPI 2021: >90 ML/MIN/1.73M*2
EOSINOPHIL # BLD AUTO: 0.17 X10*3/UL (ref 0–0.7)
EOSINOPHIL NFR BLD AUTO: 1.6 %
ERYTHROCYTE [DISTWIDTH] IN BLOOD BY AUTOMATED COUNT: 13.7 % (ref 11.5–14.5)
GLUCOSE SERPL-MCNC: 91 MG/DL (ref 74–99)
HCT VFR BLD AUTO: 44.2 % (ref 36–46)
HDLC SERPL-MCNC: 40.9 MG/DL
HGB BLD-MCNC: 13.6 G/DL (ref 12–16)
IMM GRANULOCYTES # BLD AUTO: 0.02 X10*3/UL (ref 0–0.7)
IMM GRANULOCYTES NFR BLD AUTO: 0.2 % (ref 0–0.9)
LDLC SERPL CALC-MCNC: 124 MG/DL
LYMPHOCYTES # BLD AUTO: 2.61 X10*3/UL (ref 1.2–4.8)
LYMPHOCYTES NFR BLD AUTO: 25.3 %
MCH RBC QN AUTO: 26.9 PG (ref 26–34)
MCHC RBC AUTO-ENTMCNC: 30.8 G/DL (ref 32–36)
MCV RBC AUTO: 87 FL (ref 80–100)
MONOCYTES # BLD AUTO: 0.63 X10*3/UL (ref 0.1–1)
MONOCYTES NFR BLD AUTO: 6.1 %
NEUTROPHILS # BLD AUTO: 6.84 X10*3/UL (ref 1.2–7.7)
NEUTROPHILS NFR BLD AUTO: 66.3 %
NON HDL CHOLESTEROL: 142 MG/DL (ref 0–149)
NRBC BLD-RTO: 0 /100 WBCS (ref 0–0)
PLATELET # BLD AUTO: 295 X10*3/UL (ref 150–450)
POC HEMOGLOBIN A1C: 5.8 % (ref 4.2–6.5)
POTASSIUM SERPL-SCNC: 3.9 MMOL/L (ref 3.5–5.3)
PROT SERPL-MCNC: 7.1 G/DL (ref 6.4–8.2)
RBC # BLD AUTO: 5.06 X10*6/UL (ref 4–5.2)
SODIUM SERPL-SCNC: 139 MMOL/L (ref 136–145)
TRIGL SERPL-MCNC: 92 MG/DL (ref 0–149)
TSH SERPL-ACNC: 1.8 MIU/L (ref 0.44–3.98)
VLDL: 18 MG/DL (ref 0–40)
WBC # BLD AUTO: 10.3 X10*3/UL (ref 4.4–11.3)

## 2024-05-13 PROCEDURE — 3078F DIAST BP <80 MM HG: CPT | Performed by: NURSE PRACTITIONER

## 2024-05-13 PROCEDURE — 1036F TOBACCO NON-USER: CPT | Performed by: NURSE PRACTITIONER

## 2024-05-13 PROCEDURE — 3008F BODY MASS INDEX DOCD: CPT | Performed by: NURSE PRACTITIONER

## 2024-05-13 PROCEDURE — 99396 PREV VISIT EST AGE 40-64: CPT | Performed by: NURSE PRACTITIONER

## 2024-05-13 PROCEDURE — 80061 LIPID PANEL: CPT

## 2024-05-13 PROCEDURE — 84443 ASSAY THYROID STIM HORMONE: CPT

## 2024-05-13 PROCEDURE — 83036 HEMOGLOBIN GLYCOSYLATED A1C: CPT | Performed by: NURSE PRACTITIONER

## 2024-05-13 PROCEDURE — 3075F SYST BP GE 130 - 139MM HG: CPT | Performed by: NURSE PRACTITIONER

## 2024-05-13 PROCEDURE — 80053 COMPREHEN METABOLIC PANEL: CPT

## 2024-05-13 PROCEDURE — 36415 COLL VENOUS BLD VENIPUNCTURE: CPT

## 2024-05-13 PROCEDURE — 86481 TB AG RESPONSE T-CELL SUSP: CPT

## 2024-05-13 PROCEDURE — 85025 COMPLETE CBC W/AUTO DIFF WBC: CPT

## 2024-05-13 ASSESSMENT — PATIENT HEALTH QUESTIONNAIRE - PHQ9
8. MOVING OR SPEAKING SO SLOWLY THAT OTHER PEOPLE COULD HAVE NOTICED. OR THE OPPOSITE, BEING SO FIGETY OR RESTLESS THAT YOU HAVE BEEN MOVING AROUND A LOT MORE THAN USUAL: NOT AT ALL
3. TROUBLE FALLING OR STAYING ASLEEP OR SLEEPING TOO MUCH: NOT AT ALL
7. TROUBLE CONCENTRATING ON THINGS, SUCH AS READING THE NEWSPAPER OR WATCHING TELEVISION: SEVERAL DAYS
10. IF YOU CHECKED OFF ANY PROBLEMS, HOW DIFFICULT HAVE THESE PROBLEMS MADE IT FOR YOU TO DO YOUR WORK, TAKE CARE OF THINGS AT HOME, OR GET ALONG WITH OTHER PEOPLE: SOMEWHAT DIFFICULT
SUM OF ALL RESPONSES TO PHQ QUESTIONS 1-9: 3
5. POOR APPETITE OR OVEREATING: SEVERAL DAYS
4. FEELING TIRED OR HAVING LITTLE ENERGY: SEVERAL DAYS
SUM OF ALL RESPONSES TO PHQ9 QUESTIONS 1 AND 2: 0
6. FEELING BAD ABOUT YOURSELF - OR THAT YOU ARE A FAILURE OR HAVE LET YOURSELF OR YOUR FAMILY DOWN: NOT AT ALL
1. LITTLE INTEREST OR PLEASURE IN DOING THINGS: NOT AT ALL
2. FEELING DOWN, DEPRESSED OR HOPELESS: NOT AT ALL
9. THOUGHTS THAT YOU WOULD BE BETTER OFF DEAD, OR OF HURTING YOURSELF: NOT AT ALL

## 2024-05-13 ASSESSMENT — ANXIETY QUESTIONNAIRES
1. FEELING NERVOUS, ANXIOUS, OR ON EDGE: SEVERAL DAYS
6. BECOMING EASILY ANNOYED OR IRRITABLE: MORE THAN HALF THE DAYS
4. TROUBLE RELAXING: NOT AT ALL
IF YOU CHECKED OFF ANY PROBLEMS ON THIS QUESTIONNAIRE, HOW DIFFICULT HAVE THESE PROBLEMS MADE IT FOR YOU TO DO YOUR WORK, TAKE CARE OF THINGS AT HOME, OR GET ALONG WITH OTHER PEOPLE: SOMEWHAT DIFFICULT
3. WORRYING TOO MUCH ABOUT DIFFERENT THINGS: NOT AT ALL
7. FEELING AFRAID AS IF SOMETHING AWFUL MIGHT HAPPEN: NOT AT ALL
GAD7 TOTAL SCORE: 3
5. BEING SO RESTLESS THAT IT IS HARD TO SIT STILL: NOT AT ALL
2. NOT BEING ABLE TO STOP OR CONTROL WORRYING: NOT AT ALL

## 2024-05-13 ASSESSMENT — COLUMBIA-SUICIDE SEVERITY RATING SCALE - C-SSRS
2. HAVE YOU ACTUALLY HAD ANY THOUGHTS OF KILLING YOURSELF?: NO
6. HAVE YOU EVER DONE ANYTHING, STARTED TO DO ANYTHING, OR PREPARED TO DO ANYTHING TO END YOUR LIFE?: NO
1. IN THE PAST MONTH, HAVE YOU WISHED YOU WERE DEAD OR WISHED YOU COULD GO TO SLEEP AND NOT WAKE UP?: NO

## 2024-05-13 NOTE — PATIENT INSTRUCTIONS
Black cohosh or evening primrose oil for hot flashes  Let me know if agitation gets worse  Dr randolph -the '[employer]    A1C today=5.8  This is the 3 month average of your sugars.  You are in the prediabetes 5.7-6.4      Handouts given to pt:  physical handout        Labs:    You can use the lab in our building when fasting. The hrs are: Monday-Friday, 7 a.m. - 5 p.m., Saturday 8 a.m. - 12 noon.   No appt needed, BUT YOU DO NEED THE PAPER ORDER.    Fasting is no food, drink, gum or mints other than water for 12 hrs.   Results will be back in 2-3 business days for most labs. It is always recommended for any orders (labs, xrays, ultrasounds,MRI, ct scan, procedures etc) to check with your insurance provider for expected costs or expenses to you.         You will get your results via phone from my medical assistant if you do not have MyChart.  OR  You will get your results via [employer]    If a result is urgent, I will call to speak to you.    Vaccines:  Up to date    General recommendations:  Exercise-cardio 4-5d/wk 30min each day  Diet-Breakfast-toast (my favorite Earlene Aldridge Delighful Multigrain or Kwesi's Killer Bread Good Seed thin-sliced)/bagel/English muffin-whole wheat flour as a 1st ingredient or cereal/oatmeal/granola bar-fiber 4g or more or protein like eggs or peanut butter; optional veggies  Lunch-protein, 1/2c carb or 2 slices bread, veg 1c  Dinner-protein, fist sized carb, veg 1c  Fruit 2 a day  Dairy 2 a day-milk, soy milk, almond milk, cheese, yogurt, cottage cheese  Snacks-Protein-hard boiled egg, nuts (walnuts/almonds/pecans/pistachios 1/4c), hummus, beef/deer jerky or meat sticks; vegetable, fruit, dairy-milk(1%, skim, almond, soy)/cheese (not a lot of cheddar)/yogurt (Greek is best-my favorite Dannon Fruit on the Bottom Greek)/cottage cheese 2%; triscuits/ popcorn/wheat thins have a lot of fiber; follow serving size on bag/box/container  increase water  Limit alcohol to 1 drink per day for women and 2  drinks per day for men (1 drink=12oz beer or 5oz wine or 1 1/2oz liquor)  Calcium: 500mg 1 twice a day if age 50 and younger and 600mg 1 twice a day if over age 50 (calcium citrate can be taken without food)  Vitamin D: 800-5000 IU/day  Limit salt to <2300mg a day if age 50 and under and <1500mg a day if over age 50/have high bp or diabetes or kidney disease  Recommend folate for childbearing age women 0.4mg per day (can be found in a multivitamin)  Recommend 18mg/dL of iron a day if age 50 and under and 8mg/dL a day if over age 50; take on an empty stomach at bedtime  Use sunscreen   Wear seatbelt  Recommend safe sex practices: using condoms everytime you have sex, discuss with a new partner about their past partners/history of STDs/drug use, avoid drinking alcohol or using drugs as this increases the chance that you will participate in high-risk sex, for oral sex help protect your mouth by having your partner use a condom (male or female), women should not douche after sex, be aware of your partner's body and your body-look for signs of a sore, blister, rash, or discharge, and have regular exams and periodic tests for STDs.  No distracted driving  No driving when under influence of substances  Wear a seatbelt  Eye dr every 1-2yrs  Dentist every 6-12 mon  Tetanus shot every 10yrs  Recommend flu vaccine in the fall  Appt in 6mon for follow up on sugar, diet, exercise and 1 year for physical      I will communicate with you via [employer] regarding messages and results. If you need help with this, you can call the support line at [phone].    IT WAS A PLEASURE TO SEE YOU TODAY. THANK YOU FOR CHOOSING US FOR YOUR HEALTHCARE NEEDS.

## 2024-05-15 LAB
NIL(NEG) CONTROL SPOT COUNT: NORMAL
PANEL A SPOT COUNT: 0
PANEL B SPOT COUNT: 0
POS CONTROL SPOT COUNT: NORMAL
T-SPOT. TB INTERPRETATION: NEGATIVE

## 2024-05-20 ENCOUNTER — OFFICE VISIT (OUTPATIENT)
Dept: CARDIOLOGY | Facility: CLINIC | Age: 44
End: 2024-05-20
Payer: COMMERCIAL

## 2024-05-20 VITALS
WEIGHT: 261 LBS | BODY MASS INDEX: 38.54 KG/M2 | DIASTOLIC BLOOD PRESSURE: 77 MMHG | HEART RATE: 58 BPM | OXYGEN SATURATION: 98 % | SYSTOLIC BLOOD PRESSURE: 127 MMHG

## 2024-05-20 DIAGNOSIS — G54.0 TOS (THORACIC OUTLET SYNDROME): ICD-10-CM

## 2024-05-20 DIAGNOSIS — M79.602 LEFT ARM PAIN: Primary | ICD-10-CM

## 2024-05-20 PROCEDURE — 99213 OFFICE O/P EST LOW 20 MIN: CPT | Performed by: INTERNAL MEDICINE

## 2024-05-20 PROCEDURE — 3074F SYST BP LT 130 MM HG: CPT | Performed by: INTERNAL MEDICINE

## 2024-05-20 PROCEDURE — 1036F TOBACCO NON-USER: CPT | Performed by: INTERNAL MEDICINE

## 2024-05-20 PROCEDURE — 3008F BODY MASS INDEX DOCD: CPT | Performed by: INTERNAL MEDICINE

## 2024-05-20 PROCEDURE — 3078F DIAST BP <80 MM HG: CPT | Performed by: INTERNAL MEDICINE

## 2024-05-20 ASSESSMENT — PATIENT HEALTH QUESTIONNAIRE - PHQ9
SUM OF ALL RESPONSES TO PHQ9 QUESTIONS 1 AND 2: 0
1. LITTLE INTEREST OR PLEASURE IN DOING THINGS: NOT AT ALL
2. FEELING DOWN, DEPRESSED OR HOPELESS: NOT AT ALL

## 2024-05-20 ASSESSMENT — PAIN SCALES - GENERAL: PAINLEVEL: 0-NO PAIN

## 2024-05-20 ASSESSMENT — ENCOUNTER SYMPTOMS
DEPRESSION: 0
LOSS OF SENSATION IN FEET: 0
OCCASIONAL FEELINGS OF UNSTEADINESS: 0

## 2024-05-20 ASSESSMENT — COLUMBIA-SUICIDE SEVERITY RATING SCALE - C-SSRS
6. HAVE YOU EVER DONE ANYTHING, STARTED TO DO ANYTHING, OR PREPARED TO DO ANYTHING TO END YOUR LIFE?: NO
2. HAVE YOU ACTUALLY HAD ANY THOUGHTS OF KILLING YOURSELF?: NO
1. IN THE PAST MONTH, HAVE YOU WISHED YOU WERE DEAD OR WISHED YOU COULD GO TO SLEEP AND NOT WAKE UP?: NO

## 2024-05-20 NOTE — PROGRESS NOTES
Chief complaint: Left arm pain     Subjective   Last seen in 2/2024   CXR, MRI spine, ordered not done yet   She underwent neck and thoracic outlet physical therapy, all her symptoms resolved  No numbness/tingling, no weakness, no discomfort, no discoloration or any other symptoms    HPI  Late 2023, the patient started having left arm pain of unclear etiology  It felt like pulling almost all the time, waking her up at night from sleep (she sleeps in supine position because of the CPAP) associated with numbness  It was there for at least 3 weeks, one day she had popping veins and right after her pain resolved  Second visit, the patient noticed that her left arm pain is not only positional but becoming constant, associated with numbness/tingling and discomfort as well as noticed LLE similar symptoms    Review of Systems  As noted above  Bilateral intermittent LE swelling, better in the a.m. worse at the end of the day  Bilateral bloody nipple discharge in 2022, mammogram with no abnormality, resolved on its own  No neck pain  No arm swelling or weakness  No arms discoloration   No change in symptoms by neck or arm movement    FH  Mom, passed away. Had VV, could not take hormones, but the patient is not sure why  Sister + VTE after ankle surgery  Multiple family members with vascular issues, details are not clear    Past Medical History:   Diagnosis Date    Abnormal menses     Bloody discharge from nipple     BMI 38.0-38.9,adult     Class 2 obesity with body mass index (BMI) of 38.0 to 38.9 in adult     Esophageal reflux     Fatigue     History of COVID-19     Hypertension     Obstructive sleep apnea, adult     Peripheral edema     Pes planus     Plantar fasciitis, bilateral     SAS (sleep apnea syndrome)     Skin lesions     Swelling of both lower extremities     Varicose vein of leg     Vitamin D deficiency      No past surgical history on file.  Social History     Tobacco Use    Smoking status: Never    Smokeless  tobacco: Never   Substance Use Topics    Alcohol use: Never    Drug use: Never      Family History   Problem Relation Name Age of Onset    Coronary artery disease Mother      Liver cancer Mother      Diabetes type II Mother      Hypertension Mother      Coronary artery disease Father      Hyperlipidemia Father      Other (Blood clots) Sister      Coronary artery disease Sister      Factor V Leiden deficiency Sister          '82 sister    No Known Problems Brother      No Known Problems Daughter      No Known Problems Son      No Known Problems Mother's Sister      No Known Problems Mother's Brother      No Known Problems Father's Sister      No Known Problems Father's Brother      Hypertension Maternal Grandmother      Lung disease Maternal Grandfather          Interstitial    Breast cancer Paternal Grandmother      No Known Problems Paternal Grandfather      No Known Problems Other        Allergies   Allergen Reactions    Tinidazole Other     Chest pain     Objective   Physical Exam  /77 (BP Location: Right arm, Patient Position: Sitting)   Pulse 58   Wt 118 kg (261 lb)   BMI 38.54 kg/m²      General:  In no acute distress  Neuro: alert and oriented x3  CV:  RRR  Lungs: CTA bilaterally  Abd:  Soft, non-tender   Psych:  Appropriate affect  Upper extremities: No swelling, +2 radial/ulnar, +1 radial  Lower extremities: No edema, +2 DP  Skin:  No open ulcers.      Medications   Current Outpatient Medications   Medication Instructions    biotin 1,000 mcg, oral, Daily, Take as directed    cholecalciferol (VITAMIN D-3) 50 mcg, oral, Daily    levonorgestrel (Mirena) 21 mcg/24 hours (8 yrs) 52 mg IUD Use as directed    triamterene-hydrochlorothiazid (Dyazide) 37.5-25 mg capsule 1 capsule, oral, Daily      Lab Review   Recent Labs     05/13/24  0904 08/16/23  0936 07/20/22  1452 01/21/22  1205 07/26/19  1001    137 139 137 136   K 3.9 4.3 4.0 3.9 4.3    101 100 100 102   CO2 26 29 30 30 24   ANIONGAP 15  11 13 11 14   BUN 10 11 10 11 10   CREATININE 0.71 0.80 0.78 0.72 0.68   EGFR >90  --   --   --   --      Recent Labs     05/13/24  0904 08/16/23  0936 07/20/22  1452 01/21/22  1205 07/26/19  1001   ALBUMIN 4.2 4.4 4.5 4.3 4.2   ALKPHOS 71 60 70 59 67   ALT 14 12 13 13 14   AST 14 12 14 12 13   BILITOT 0.6 0.7 0.5 0.4 0.5     Recent Labs     05/13/24  0904 08/16/23  0936 07/20/22  1452 01/21/22  1205 07/26/19  1001   WBC 10.3 8.9 10.8 9.3 9.4   HGB 13.6 13.3 13.9 13.8 14.2   HCT 44.2 42.7 45.1 44.5 46.7*    270 275 285 286   MCV 87 87 88 88 89     Recent Labs     01/21/22  1205   DDIMERVTE 600*     PTT - No results in last year.  _   _ _     Recent Labs     05/13/24  0904 08/16/23  0936 07/20/22  1452 07/26/19  1001   CHOL 183 177 186 164   LDLF  --  122* 123* 108*   HDL 40.9 43.0 47.0 39.6*   TRIG 92 58 80 84     Lab Results   Component Value Date    HGBA1C 5.8 05/13/2024     Lab Results   Component Value Date    TSH 1.80 05/13/2024     Imaging  LUE DVT US 12/19/23   Right Upper Venous: The subclavian vein demonstrates a normal spontaneous and phasic flow.  Left Upper Venous: No evidence of acute deep vein thrombus visualized in the left upper extremity.     PVR TOS 12/19/23   Right Upper PVR: A decrease in PPG waveform amplitude was noted in the alternative position; patient laying down (supine) with arm relaxed at patients side on the bed.  Left Upper PVR: A decrease in PPG waveform amplitude was noted in the alternative position; patient laying down (supine) with arm relaxed at patients side on the bed.    Assessment/Plan   Marcelle Macdonald is a 44 y.o. female with past medical history of HTN, obesity and BRITNI, following with vascular medicine for left upper extremity pain and more prominent veins    _Arterial TOS noted at supine position with arm relaxed  _Left upper and lower extremity pain/discomfort associated with numbness and tingling    Plan  PVRs was suggestive of arterial TOS, chest x-ray ordered  to rule out anatomical abnormalities and patient was referred to Dr. Salazar for further evaluation, but she started having complaint of the whole left side discomfort associated with numbness and tingling, MRI spine was ordered to rule out underlying neuropathy, she is planning on getting the tests done  Continue physical therapy  We discussed the signs and symptoms that require immediate medical attention    Misty Ramírez MD

## 2024-06-20 ENCOUNTER — APPOINTMENT (OUTPATIENT)
Dept: DERMATOLOGY | Facility: CLINIC | Age: 44
End: 2024-06-20
Payer: COMMERCIAL

## 2024-06-24 ENCOUNTER — APPOINTMENT (OUTPATIENT)
Dept: DERMATOLOGY | Facility: CLINIC | Age: 44
End: 2024-06-24
Payer: COMMERCIAL

## 2024-06-24 DIAGNOSIS — D48.5 NEOPLASM OF UNCERTAIN BEHAVIOR OF SKIN: Primary | ICD-10-CM

## 2024-06-24 PROCEDURE — 1036F TOBACCO NON-USER: CPT | Performed by: DERMATOLOGY

## 2024-06-24 PROCEDURE — 3008F BODY MASS INDEX DOCD: CPT | Performed by: DERMATOLOGY

## 2024-06-24 PROCEDURE — 40490 BIOPSY OF LIP: CPT | Performed by: DERMATOLOGY

## 2024-06-24 ASSESSMENT — DERMATOLOGY PATIENT ASSESSMENT
WHAT TYPE OF BIRTH CONTROL: IUD
WHAT TYPE OF BIRTH CONTROL: IUD
HAVE YOU HAD OR DO YOU HAVE A STAPH INFECTION: NO
HAVE YOU HAD OR DO YOU HAVE A STAPH INFECTION: NO
DO YOU USE SUNSCREEN: OCCASIONALLY
DO YOU HAVE ANY NEW OR CHANGING LESIONS: NO
HAVE YOU HAD OR DO YOU HAVE VASCULAR DISEASE: NO
ARE YOU AN ORGAN TRANSPLANT RECIPIENT: NO
HAVE YOU HAD OR DO YOU HAVE VASCULAR DISEASE: NO
ARE YOU AN ORGAN TRANSPLANT RECIPIENT: NO
DO YOU USE A TANNING BED: NO

## 2024-06-24 ASSESSMENT — DERMATOLOGY QUALITY OF LIFE (QOL) ASSESSMENT
WHAT SINGLE SKIN CONDITION LISTED BELOW IS THE PATIENT ANSWERING THE QUALITY-OF-LIFE ASSESSMENT QUESTIONS ABOUT: NONE OF THE ABOVE
RATE HOW BOTHERED YOU ARE BY EFFECTS OF YOUR SKIN PROBLEMS ON YOUR ACTIVITIES (EG, GOING OUT, ACCOMPLISHING WHAT YOU WANT, WORK ACTIVITIES OR YOUR RELATIONSHIPS WITH OTHERS): 0 - NEVER BOTHERED
RATE HOW BOTHERED YOU ARE BY EFFECTS OF YOUR SKIN PROBLEMS ON YOUR ACTIVITIES (EG, GOING OUT, ACCOMPLISHING WHAT YOU WANT, WORK ACTIVITIES OR YOUR RELATIONSHIPS WITH OTHERS): 0 - NEVER BOTHERED
ARE THERE EXCLUSIONS OR EXCEPTIONS FOR THE QUALITY OF LIFE ASSESSMENT: NO
RATE HOW BOTHERED YOU ARE BY SYMPTOMS OF YOUR SKIN PROBLEM (EG, ITCHING, STINGING BURNING, HURTING OR SKIN IRRITATION): 0 - NEVER BOTHERED
RATE HOW EMOTIONALLY BOTHERED YOU ARE BY YOUR SKIN PROBLEM (FOR EXAMPLE, WORRY, EMBARRASSMENT, FRUSTRATION): 0 - NEVER BOTHERED
RATE HOW EMOTIONALLY BOTHERED YOU ARE BY YOUR SKIN PROBLEM (FOR EXAMPLE, WORRY, EMBARRASSMENT, FRUSTRATION): 0 - NEVER BOTHERED
ARE THERE EXCLUSIONS OR EXCEPTIONS FOR THE QUALITY OF LIFE ASSESSMENT: NO
RATE HOW BOTHERED YOU ARE BY SYMPTOMS OF YOUR SKIN PROBLEM (EG, ITCHING, STINGING BURNING, HURTING OR SKIN IRRITATION): 0 - NEVER BOTHERED

## 2024-06-24 ASSESSMENT — ITCH NUMERIC RATING SCALE: HOW SEVERE IS YOUR ITCHING?: 0

## 2024-06-24 ASSESSMENT — PATIENT GLOBAL ASSESSMENT (PGA): PATIENT GLOBAL ASSESSMENT: PATIENT GLOBAL ASSESSMENT:  1 - CLEAR

## 2024-06-24 NOTE — PROGRESS NOTES
Subjective     Marcelle Macdonald is a 44 y.o. female who presents for the following: Suspicious Skin Lesion (Patient here today for lesion on lower lip that is getting bigger. Has had it from 12/22.).     Review of Systems:  No other skin or systemic complaints other than what is documented elsewhere in the note.    The following portions of the chart were reviewed this encounter and updated as appropriate:          Skin Cancer History  No skin cancer on file.      Specialty Problems          Dermatology Problems    Fungal nail infection        Objective   Well appearing patient in no apparent distress; mood and affect are within normal limits.    A focused skin examination was performed of the face, lip. All findings within normal limits unless otherwise noted below.    Assessment/Plan   1. Neoplasm of uncertain behavior of skin  Mid Lower Vermilion Lip  0.4cm brown macule with irregular borders          Lesion biopsy  Type of biopsy: tangential    Informed consent: discussed and consent obtained    Timeout: patient name, date of birth, surgical site, and procedure verified    Procedure prep:  Patient was prepped and draped  Anesthesia: the lesion was anesthetized in a standard fashion    Anesthetic:  1% lidocaine w/ epinephrine 1-100,000 local infiltration  Instrument used: DermaBlade    Hemostasis achieved with: aluminum chloride and electrodesiccation    Outcome: patient tolerated procedure well    Post-procedure details: sterile dressing applied and wound care instructions given    Dressing type: petrolatum and bandage      Staff Communication: Dermatology Local Anesthesia: 1 % Lidocaine / Epinephrine - Amount:1cc    Specimen 1 - Dermatopathology- DERM LAB  Differential Diagnosis: atypical nevus vs. Melanotic macules  Check Margins Yes/No?:    Comments:    Dermpath Lab: Routine Histopathology (formalin-fixed tissue)    Lesion concerning for atypical nevus vs. Melanotic macule. The need for biopsy for definitive  diagnosis recommended. Risks and benefits reviewed, see procedure note.

## 2024-06-26 LAB
LABORATORY COMMENT REPORT: NORMAL
PATH REPORT.FINAL DX SPEC: NORMAL
PATH REPORT.GROSS SPEC: NORMAL
PATH REPORT.MICROSCOPIC SPEC OTHER STN: NORMAL
PATH REPORT.RELEVANT HX SPEC: NORMAL
PATH REPORT.TOTAL CANCER: NORMAL

## 2024-07-23 ENCOUNTER — APPOINTMENT (OUTPATIENT)
Dept: SLEEP MEDICINE | Facility: CLINIC | Age: 44
End: 2024-07-23
Payer: COMMERCIAL

## 2024-08-15 ENCOUNTER — TELEPHONE (OUTPATIENT)
Dept: PRIMARY CARE | Facility: CLINIC | Age: 44
End: 2024-08-15
Payer: COMMERCIAL

## 2024-08-15 DIAGNOSIS — I10 HYPERTENSION, UNSPECIFIED TYPE: ICD-10-CM

## 2024-08-15 RX ORDER — TRIAMTERENE AND HYDROCHLOROTHIAZIDE 37.5; 25 MG/1; MG/1
1 CAPSULE ORAL DAILY
Qty: 90 CAPSULE | Refills: 1 | Status: SHIPPED | OUTPATIENT
Start: 2024-08-15

## 2024-11-14 ASSESSMENT — ENCOUNTER SYMPTOMS
WHEEZING: 0
SHORTNESS OF BREATH: 0
CONSTITUTIONAL NEGATIVE: 1

## 2024-11-14 NOTE — PROGRESS NOTES
Subjective   Patient ID: Marcelle Macdonald is a 44 y.o. female who presents for Follow-up.  Last physical:5/13/24    Is pt fasting? No  Does pt want flu shot? Yes  BPs at home (put an avg of the numbers)-checking at work, up and down.  Using cpap nightly? Yes  Any other questions or concerns that pt wants to discuss today?  Bad right wrist pain that radiates up into her finger, no falls or injuries.  Thinks she is going through menopause.    Poc A1c=6.1        HPI  Last labs-5/2024 A1c 5.8  CBC (complete blood count) was normal which looks at infection and anemia markers.  Trigs normal.  Hdl low at 40. Goal >50 for women. Incr exercise.  Ldl high at 124. Goal <100. Decr fats and incr exercise.  TSH (thyroid test) was normal.  Sugar (aka glucose), kidney function, liver function and electrolytes in the CMP (comprehensive metabolic panel) were normal.  2/2024 A1c 5.7  8/2023 vit d nl, tsh nl, ldl 122, hdl 43, cbc nl, cmp nl  Due for labs- cmp    Cholesterol   Date Value Ref Range Status   05/13/2024 183 0 - 199 mg/dL Final     Comment:           Age      Desirable   Borderline High   High     0-19 Y     0 - 169       170 - 199     >/= 200    20-24 Y     0 - 189       190 - 224     >/= 225         >24 Y     0 - 199       200 - 239     >/= 240   **All ranges are based on fasting samples. Specific   therapeutic targets will vary based on patient-specific   cardiac risk.    Pediatric guidelines reference:Pediatrics 2011, 128(S5).Adult guidelines reference: NCEP ATPIII Guidelines,BEATRICE 2001, 258:2486-97    Venipuncture immediately after or during the administration of Metamizole may lead to falsely low results. Testing should be performed immediately prior to Metamizole dosing.   08/16/2023 177 0 - 199 mg/dL Final     Comment:     .      AGE      DESIRABLE   BORDERLINE HIGH   HIGH     0-19 Y     0 - 169       170 - 199     >/= 200    20-24 Y     0 - 189       190 - 224     >/= 225         >24 Y     0 - 199       200 - 239      >/= 240   **All ranges are based on fasting samples. Specific   therapeutic targets will vary based on patient-specific   cardiac risk.  .   Pediatric guidelines reference:Pediatrics 2011, 128(S5).   Adult guidelines reference: NCEP ATPIII Guidelines,     BEATRICE 2001, 258:2486-97  .   Venipuncture immediately after or during the    administration of Metamizole may lead to falsely   low results. Testing should be performed immediately   prior to Metamizole dosing.   07/20/2022 186 0 - 199 mg/dL Final     Comment:     .      AGE      DESIRABLE   BORDERLINE HIGH   HIGH     0-19 Y     0 - 169       170 - 199     >/= 200    20-24 Y     0 - 189       190 - 224     >/= 225         >24 Y     0 - 199       200 - 239     >/= 240   **All ranges are based on fasting samples. Specific   therapeutic targets will vary based on patient-specific   cardiac risk.  .   Pediatric guidelines reference:Pediatrics 2011, 128(S5).   Adult guidelines reference: NCEP ATPIII Guidelines,     BEATRICE 2001, 258:2486-97  .   Venipuncture immediately after or during the    administration of Metamizole may lead to falsely   low results. Testing should be performed immediately   prior to Metamizole dosing.     07/26/2019 164 0 - 199 mg/dL Final     Comment:     .      AGE      DESIRABLE   BORDERLINE HIGH   HIGH     0-19 Y     0 - 169       170 - 199     >/= 200    20-24 Y     0 - 189       190 - 224     >/= 225         >24 Y     0 - 199       200 - 239     >/= 240   **All ranges are based on fasting samples. Specific   therapeutic targets will vary based on patient-specific   cardiac risk.  .   Pediatric guidelines reference:Pediatrics 2011, 128(S5).   Adult guidelines reference: NCEP ATPIII Guidelines,     BEATRICE 2001, 258:2486-97  .   Venipuncture immediately after or during the    administration of Metamizole may lead to falsely   low results. Testing should be performed immediately   prior to Metamizole dosing.       Triglycerides   Date Value Ref Range  Status   05/13/2024 92 0 - 149 mg/dL Final     Comment:        Age         Desirable   Borderline High   High     Very High   0 D-90 D    19 - 174         ----         ----        ----  91 D- 9 Y     0 -  74        75 -  99     >/= 100      ----    10-19 Y     0 -  89        90 - 129     >/= 130      ----    20-24 Y     0 - 114       115 - 149     >/= 150      ----         >24 Y     0 - 149       150 - 199    200- 499    >/= 500    Venipuncture immediately after or during the administration of Metamizole may lead to falsely low results. Testing should be performed immediately prior to Metamizole dosing.   08/16/2023 58 0 - 149 mg/dL Final     Comment:     .      AGE      DESIRABLE   BORDERLINE HIGH   HIGH     VERY HIGH   0 D-90 D    19 - 174         ----         ----        ----  91 D- 9 Y     0 -  74        75 -  99     >/= 100      ----    10-19 Y     0 -  89        90 - 129     >/= 130      ----    20-24 Y     0 - 114       115 - 149     >/= 150      ----         >24 Y     0 - 149       150 - 199    200- 499    >/= 500  .   Venipuncture immediately after or during the    administration of Metamizole may lead to falsely   low results. Testing should be performed immediately   prior to Metamizole dosing.   07/20/2022 80 0 - 149 mg/dL Final     Comment:     .      AGE      DESIRABLE   BORDERLINE HIGH   HIGH     VERY HIGH   0 D-90 D    19 - 174         ----         ----        ----  91 D- 9 Y     0 -  74        75 -  99     >/= 100      ----    10-19 Y     0 -  89        90 - 129     >/= 130      ----    20-24 Y     0 - 114       115 - 149     >/= 150      ----         >24 Y     0 - 149       150 - 199    200- 499    >/= 500  .   Venipuncture immediately after or during the    administration of Metamizole may lead to falsely   low results. Testing should be performed immediately   prior to Metamizole dosing.     07/26/2019 84 0 - 149 mg/dL Final     Comment:     .      AGE      DESIRABLE   BORDERLINE HIGH   HIGH      "VERY HIGH   0 D-90 D    19 - 174         ----         ----        ----  91 D- 9 Y     0 -  74        75 -  99     >/= 100      ----    10-19 Y     0 -  89        90 - 129     >/= 130      ----    20-24 Y     0 - 114       115 - 149     >/= 150      ----         >24 Y     0 - 149       150 - 199    200- 499    >/= 500  .   Venipuncture immediately after or during the    administration of Metamizole may lead to falsely   low results. Testing should be performed immediately   prior to Metamizole dosing.       HDL-Cholesterol   Date Value Ref Range Status   05/13/2024 40.9 mg/dL Final     Comment:       Age       Very Low   Low     Normal    High    0-19 Y    < 35      < 40     40-45     ----  20-24 Y    ----     < 40      >45      ----        >24 Y      ----     < 40     40-60      >60       HDL   Date Value Ref Range Status   08/16/2023 43.0 mg/dL Final     Comment:     .      AGE      VERY LOW   LOW     NORMAL    HIGH       0-19 Y       < 35   < 40     40-45     ----    20-24 Y       ----   < 40       >45     ----      >24 Y       ----   < 40     40-60      >60  .   07/20/2022 47.0 mg/dL Final     Comment:     .      AGE      VERY LOW   LOW     NORMAL    HIGH       0-19 Y       < 35   < 40     40-45     ----    20-24 Y       ----   < 40       >45     ----      >24 Y       ----   < 40     40-60      >60  .     07/26/2019 39.6 (A) mg/dL Final     Comment:     .      AGE      VERY LOW   LOW     NORMAL    HIGH       0-19 Y       < 35   < 40     40-45     ----    20-24 Y       ----   < 40       >45     ----      >24 Y       ----   < 40     40-60      >60  .       No results found for: \"LDL\"  Thyroid Stimulating Hormone   Date Value Ref Range Status   05/13/2024 1.80 0.44 - 3.98 mIU/L Final     No results found for: \"A1C\"  No components found for: \"POCA1C\"  No results found for: \"ALBUR\"  No components found for: \"POCALBUR\"    right wrist pain that radiates up into her fingers -radial lump noted; 2nd -5th no falls or " injuries.      Thinks she is going through menopause. On mirena  Hot flashes  Mood up and down  Decr motivation  No depressed  Dr green did not discuss hormones with her only wt loss    Does not want wt loss shot  Exercise-was good in summer; works nights  Diet-2 meals-dinner and breakfast  Water and zero pop    Immunization History   Administered Date(s) Administered    Flu vaccine (IIV4), preservative free *Check age/dose* 09/27/2023    Influenza, seasonal, injectable 11/19/2021    Moderna SARS-CoV-2 Vaccination 12/10/2021    PPD Test 05/31/2016, 08/14/2018, 08/28/2018, 07/26/2019    Pfizer Purple Cap SARS-CoV-2 03/22/2021, 04/12/2021    Tdap vaccine, age 7 year and older (BOOSTRIX, ADACEL) 02/21/2007, 06/20/2018        No care team member to display     Review of Systems   Constitutional: Negative.    Respiratory:  Negative for shortness of breath and wheezing.    Cardiovascular:  Negative for chest pain.   Musculoskeletal:         Rt wrist pain       Objective   Visit Vitals  /86   Pulse 73   Temp 36.4 °C (97.6 °F)      BP Readings from Last 3 Encounters:   11/15/24 141/86   05/20/24 127/77   05/13/24 131/77     Wt Readings from Last 3 Encounters:   11/15/24 118 kg (259 lb 6.4 oz)   05/20/24 118 kg (261 lb)   05/13/24 118 kg (261 lb)       The ASCVD Risk score (Nestor CHURHC, et al., 2019) failed to calculate for the following reasons:    Unable to determine if patient is Non-      Physical Exam  Constitutional:       General: She is not in acute distress.     Appearance: Normal appearance.   Musculoskeletal:      Comments: Rt wrist-hard 1cm lump at radial side. No redness rash or swelling. Tender to palpation. FROM. Nl strength in hand and arm.radial pulse 2+   Neurological:      Mental Status: She is alert.       Assessment/Plan   Diagnoses and all orders for this visit:  Primary hypertension  -     Comprehensive Metabolic Panel; Future  -     triamterene-hydrochlorothiazid  (Dyazide) 37.5-25 mg capsule; Take 1 capsule by mouth once daily.  Prediabetes  -     POCT glycosylated hemoglobin (Hb A1C) manually resulted  -     Comprehensive Metabolic Panel; Future  Hypertension, unspecified type  Right wrist pain  -     Referral to Orthopaedic Surgery; Future  BMI 38.0-38.9,adult  -     Referral to Psychology; Future  Class 2 severe obesity due to excess calories with serious comorbidity and body mass index (BMI) of 38.0 to 38.9 in adult  Lack of motivation  -     Referral to Psychology; Future  Other orders  -     Flu vaccine, trivalent, preservative free, age 6 months and greater (Fluarix/Fluzone/Flulaval)  -     Follow Up In Primary Care - Health Maintenance; Future

## 2024-11-15 ENCOUNTER — APPOINTMENT (OUTPATIENT)
Dept: PRIMARY CARE | Facility: CLINIC | Age: 44
End: 2024-11-15
Payer: COMMERCIAL

## 2024-11-15 VITALS
HEIGHT: 69 IN | TEMPERATURE: 97.6 F | HEART RATE: 73 BPM | WEIGHT: 259.4 LBS | DIASTOLIC BLOOD PRESSURE: 82 MMHG | SYSTOLIC BLOOD PRESSURE: 124 MMHG | BODY MASS INDEX: 38.42 KG/M2

## 2024-11-15 DIAGNOSIS — Z91.89 LACK OF MOTIVATION: ICD-10-CM

## 2024-11-15 DIAGNOSIS — M25.531 RIGHT WRIST PAIN: ICD-10-CM

## 2024-11-15 DIAGNOSIS — I10 PRIMARY HYPERTENSION: Primary | ICD-10-CM

## 2024-11-15 DIAGNOSIS — E66.812 CLASS 2 SEVERE OBESITY DUE TO EXCESS CALORIES WITH SERIOUS COMORBIDITY AND BODY MASS INDEX (BMI) OF 38.0 TO 38.9 IN ADULT: ICD-10-CM

## 2024-11-15 DIAGNOSIS — R73.03 PREDIABETES: ICD-10-CM

## 2024-11-15 DIAGNOSIS — I10 HYPERTENSION, UNSPECIFIED TYPE: ICD-10-CM

## 2024-11-15 DIAGNOSIS — E66.01 CLASS 2 SEVERE OBESITY DUE TO EXCESS CALORIES WITH SERIOUS COMORBIDITY AND BODY MASS INDEX (BMI) OF 38.0 TO 38.9 IN ADULT: ICD-10-CM

## 2024-11-15 LAB
NON-UH HIE A/G RATIO: 1.2
NON-UH HIE ALB: 3.8 G/DL (ref 3.4–5)
NON-UH HIE ALK PHOS: 84 UNIT/L (ref 45–117)
NON-UH HIE BILIRUBIN, TOTAL: 0.5 MG/DL (ref 0.3–1.2)
NON-UH HIE BUN/CREAT RATIO: 10
NON-UH HIE BUN: 9 MG/DL (ref 9–23)
NON-UH HIE CALCIUM: 9.6 MG/DL (ref 8.7–10.4)
NON-UH HIE CALCULATED OSMOLALITY: 280 MOSM/KG (ref 275–295)
NON-UH HIE CHLORIDE: 105 MMOL/L (ref 98–107)
NON-UH HIE CO2, VENOUS: 31 MMOL/L (ref 20–31)
NON-UH HIE CREATININE: 0.9 MG/DL (ref 0.5–0.8)
NON-UH HIE GFR AA: >60
NON-UH HIE GLOBULIN: 3.3 G/DL
NON-UH HIE GLOMERULAR FILTRATION RATE: >60 ML/MIN/1.73M?
NON-UH HIE GLUCOSE: 94 MG/DL (ref 74–106)
NON-UH HIE GOT: 13 UNIT/L (ref 15–37)
NON-UH HIE GPT: 13 UNIT/L (ref 10–49)
NON-UH HIE K: 4.4 MMOL/L (ref 3.5–5.1)
NON-UH HIE NA: 141 MMOL/L (ref 135–145)
NON-UH HIE TOTAL PROTEIN: 7.1 G/DL (ref 5.7–8.2)
POC HEMOGLOBIN A1C: 6.1 % (ref 4.2–6.5)

## 2024-11-15 PROCEDURE — 3074F SYST BP LT 130 MM HG: CPT | Performed by: NURSE PRACTITIONER

## 2024-11-15 PROCEDURE — 90656 IIV3 VACC NO PRSV 0.5 ML IM: CPT | Performed by: NURSE PRACTITIONER

## 2024-11-15 PROCEDURE — 3079F DIAST BP 80-89 MM HG: CPT | Performed by: NURSE PRACTITIONER

## 2024-11-15 PROCEDURE — 99214 OFFICE O/P EST MOD 30 MIN: CPT | Performed by: NURSE PRACTITIONER

## 2024-11-15 PROCEDURE — 83036 HEMOGLOBIN GLYCOSYLATED A1C: CPT | Performed by: NURSE PRACTITIONER

## 2024-11-15 PROCEDURE — 3008F BODY MASS INDEX DOCD: CPT | Performed by: NURSE PRACTITIONER

## 2024-11-15 PROCEDURE — 1036F TOBACCO NON-USER: CPT | Performed by: NURSE PRACTITIONER

## 2024-11-15 PROCEDURE — 3077F SYST BP >= 140 MM HG: CPT | Performed by: NURSE PRACTITIONER

## 2024-11-15 PROCEDURE — 90471 IMMUNIZATION ADMIN: CPT | Performed by: NURSE PRACTITIONER

## 2024-11-15 RX ORDER — TRIAMTERENE AND HYDROCHLOROTHIAZIDE 37.5; 25 MG/1; MG/1
1 CAPSULE ORAL DAILY
Qty: 90 CAPSULE | Refills: 2 | Status: SHIPPED | OUTPATIENT
Start: 2024-11-15

## 2024-11-15 NOTE — PATIENT INSTRUCTIONS
A1C today=6.1  This is the 3 month average of your sugars.  You are in the prediabetes range 5.7-6.4    Refill bp med    Flu shot today    Lab today    Continue cpap    Black cohosh  Cortisol manager [facility]    Dr alanis  [facility], [address]  [address] [place]  [place] [address]  [phone]  Discuss hormones    See therapist    Boundaries with work  10min twice a wk  Baked goods-decrease to 4d a wk  Start with neighbor again    See ortho     Return in may for wellness appt      I will communicate with you via [employer] regarding messages and results. If you need help with this, you can call the support line at [phone].    IT WAS A PLEASURE TO SEE YOU TODAY. THANK YOU FOR CHOOSING US FOR YOUR HEALTHCARE NEEDS.

## 2024-12-03 ENCOUNTER — OFFICE VISIT (OUTPATIENT)
Dept: SLEEP MEDICINE | Facility: CLINIC | Age: 44
End: 2024-12-03
Payer: COMMERCIAL

## 2024-12-03 VITALS
BODY MASS INDEX: 37.62 KG/M2 | HEART RATE: 70 BPM | WEIGHT: 254 LBS | HEIGHT: 69 IN | SYSTOLIC BLOOD PRESSURE: 127 MMHG | DIASTOLIC BLOOD PRESSURE: 75 MMHG

## 2024-12-03 DIAGNOSIS — G47.33 OBSTRUCTIVE SLEEP APNEA, ADULT: Primary | ICD-10-CM

## 2024-12-03 DIAGNOSIS — I10 PRIMARY HYPERTENSION: ICD-10-CM

## 2024-12-03 PROCEDURE — 99214 OFFICE O/P EST MOD 30 MIN: CPT | Performed by: NURSE PRACTITIONER

## 2024-12-03 PROCEDURE — 3074F SYST BP LT 130 MM HG: CPT | Performed by: NURSE PRACTITIONER

## 2024-12-03 PROCEDURE — 3008F BODY MASS INDEX DOCD: CPT | Performed by: NURSE PRACTITIONER

## 2024-12-03 PROCEDURE — 3078F DIAST BP <80 MM HG: CPT | Performed by: NURSE PRACTITIONER

## 2024-12-03 PROCEDURE — 1036F TOBACCO NON-USER: CPT | Performed by: NURSE PRACTITIONER

## 2024-12-03 ASSESSMENT — SLEEP AND FATIGUE QUESTIONNAIRES
SLEEP_PROBLEM_NOTICEABLE_TO_OTHERS: NOT AT ALL NOTICEABLE
HOW LIKELY ARE YOU TO NOD OFF OR FALL ASLEEP WHILE WATCHING TV: SLIGHT CHANCE OF DOZING
SATISFACTION_WITH_CURRENT_SLEEP_PATTERN: VERY SATISFIED
HOW LIKELY ARE YOU TO NOD OFF OR FALL ASLEEP WHILE SITTING QUIETLY AFTER LUNCH WITHOUT ALCOHOL: WOULD NEVER DOZE
HOW LIKELY ARE YOU TO NOD OFF OR FALL ASLEEP WHILE SITTING AND READING: WOULD NEVER DOZE
SLEEP_PROBLEM_INTERFERES_DAILY_ACTIVITIES: NOT AT ALL NOTICEABLE
SITING INACTIVE IN A PUBLIC PLACE LIKE A CLASS ROOM OR A MOVIE THEATER: WOULD NEVER DOZE
DIFFICULTY_STAYING_ASLEEP: MILD
HOW LIKELY ARE YOU TO NOD OFF OR FALL ASLEEP WHEN YOU ARE A PASSENGER IN A CAR FOR AN HOUR WITHOUT A BREAK: SLIGHT CHANCE OF DOZING
WORRIED_DISTRESSED_DUE_TO_SLEEP: NOT AT ALL NOTICEABLE
WAKING_TOO_EARLY: MILD
HOW LIKELY ARE YOU TO NOD OFF OR FALL ASLEEP IN A CAR, WHILE STOPPED FOR A FEW MINUTES IN TRAFFIC: WOULD NEVER DOZE
HOW LIKELY ARE YOU TO NOD OFF OR FALL ASLEEP WHILE LYING DOWN TO REST IN THE AFTERNOON WHEN CIRCUMSTANCES PERMIT: WOULD NEVER DOZE
HOW LIKELY ARE YOU TO NOD OFF OR FALL ASLEEP WHILE SITTING AND TALKING TO SOMEONE: WOULD NEVER DOZE
ESS-CHAD TOTAL SCORE: 2

## 2024-12-03 NOTE — PROGRESS NOTES
Patient: Marcelle Macdonald    [id number]  : 1980 -- AGE 44 y.o.    Provider: BLAKE Doshi     Location [facility]   Service Date: 12/3/2024              [facility]  Followup Visit Note    HISTORY OF PRESENT ILLNESS       HISTORY OF PRESENT ILLNESS   Marcelle Macdonald is a 44 y.o. female with past medical history of HTN, obesity, Vit D deficiency COVID- who presents to a [facility] for followup.     PAST SLEEP HISTORY  Marcelle was diagnosed with: Mild Obstructive Sleep Apnea 20 with AHI of 12.1 and SpO2 selma of 84%.  PAP titration was completed 20 showing CPAP of 6 cwp adequately controlled BRITNI.     CURRENT SLEEP HISTORY    On today's visit, the patient reports doing well on PAP. Feels she does not sleep as well without it. Notes her daughter has been waking her up on occasion, sleeps in her room or sleeps in her bed occasionally.   Notes the mask and air pressure are comfortable. Needs supplies Rx updated.     RLS Followup:   Denies     Insomnia follow up:  Bedtime: 11 pm  Sleep Latency: 12 am  Awakenings:  Wake time: 5-6 AM  Naps:   none     ESS: 2   GAUTAM: 2  FOSQ: 39    REVIEW OF SYSTEMS     REVIEW OF SYSTEMS  Review of Systems   All other systems reviewed and are negative.      ALLERGIES AND MEDICATIONS     ALLERGIES  Allergies   Allergen Reactions    Tinidazole Other     Chest pain       MEDICATIONS  Current Outpatient Medications   Medication Sig Dispense Refill    biotin 1 mg tablet Take 1 tablet (1,000 mcg) by mouth once daily. Take as directed      cholecalciferol (Vitamin D-3) 50 mcg (2,000 unit) capsule Take 1 capsule (50 mcg) by mouth early in the morning..      levonorgestrel (Mirena) 21 mcg/24 hours (8 yrs) 52 mg IUD Use as directed      triamterene-hydrochlorothiazid (Dyazide) 37.5-25 mg capsule Take 1 capsule by mouth once daily. 90 capsule 2     No current  "facility-administered medications for this visit.       PPAST MEDICAL HISTORY  Past Medical History:   Diagnosis Date    Abnormal menses     Bloody discharge from nipple     BMI 38.0-38.9,adult     Class 2 obesity with body mass index (BMI) of 38.0 to 38.9 in adult     Esophageal reflux     Fatigue     History of COVID-19     Hypertension     Obstructive sleep apnea, adult     Peripheral edema     Pes planus     Plantar fasciitis, bilateral     SAS (sleep apnea syndrome)     Skin lesions     Swelling of both lower extremities     Varicose vein of leg     Vitamin D deficiency        PAST SURGICAL HISTORY:  History reviewed. No pertinent surgical history.    FAMILY HISTORY  Family History   Problem Relation Name Age of Onset    Coronary artery disease Mother      Liver cancer Mother      Diabetes type II Mother      Hypertension Mother      Coronary artery disease Father      Hyperlipidemia Father      Other (Blood clots) Sister      Coronary artery disease Sister      Factor V Leiden deficiency Sister          '82 sister    No Known Problems Brother      No Known Problems Daughter      No Known Problems Son      No Known Problems Mother's Sister      No Known Problems Mother's Brother      No Known Problems Father's Sister      No Known Problems Father's Brother      Hypertension Maternal Grandmother      Lung disease Maternal Grandfather          Interstitial    Breast cancer Paternal Grandmother      No Known Problems Paternal Grandfather      No Known Problems Other         FAMILY HISTORY: No changes since previous visit. Otherwise non-contributory as charted.       SOCIAL HISTORY  She  reports that she has never smoked. She has never used smokeless tobacco. She reports that she does not drink alcohol and does not use drugs.       PHYSICAL EXAM     VITAL SIGNS: /75 (BP Location: Left arm, Patient Position: Sitting)   Pulse 70   Ht 1.753 m (5' 9\")   Wt 115 kg (254 lb)   BMI 37.51 kg/m²      PREVIOUS " WEIGHTS:  Wt Readings from Last 3 Encounters:   12/03/24 115 kg (254 lb)   11/15/24 118 kg (259 lb 6.4 oz)   05/20/24 118 kg (261 lb)       Physical Exam    Physical Exam   Constitutional: Alert and oriented, cooperative, no obvious distress   HEENT: Non icteric or anemic, EOM WNL bilaterally   Neck: Supple, no JVD, no goiter, no adenopathy, no rigidity  Chest: CTA bilaterally, no wheezing, crackles, rubs   Cardiac: RRR, S1 and S2, no murmur, rub, thrill   Abdomen: Obese, Soft, nontender, no masses, no organomegaly   Extremities: No clubbing, no LL edema   Neuromuscular: Cranial nerves grossly intact, no focal deficits    RESULTS/DATA     Bicarbonate (mmol/L)   Date Value   05/13/2024 26   08/16/2023 29   07/20/2022 30   01/21/2022 30       No results found for this or any previous visit from the past 365 days.         PAP Adherence:    Airsense 10 setup 9-25-20    N30 small wide due now    Medical mutual commercial    ASSESSMENT/PLAN     Ms. Macdonald is a 44 y.o. female and She returns in followup to the [facility] for BRITNI.    Problem List and Orders  Problem List Items Addressed This Visit             ICD-10-CM    Primary hypertension I10     BP at goal today  Follow with PCP           Obstructive sleep apnea, adult - Primary G47.33     Mild Obstructive Sleep Apnea 7/9/20 with AHI of 12.1 and SpO2 selma of 84%.  PAP titration was completed 8/6/20 showing CPAP of 6 cwp adequately controlled BRITNI.  -Well controlled on current settings  -continue current PAP settings  -Supply order updated today  -RTC 12 mo or sooner if needed          Relevant Orders    Positive Airway Pressure (PAP) Therapy       Disposition    Return to clinic in 12 months

## 2024-12-03 NOTE — PATIENT INSTRUCTIONS
[facility] Sleep Medicine  [facility] [address]  [facility]  [address]  [place] [address]       NAME: Marcelle Macdonald   DATE:  12/03/24    DIAGNOSIS:   1. Obstructive sleep apnea, adult  Positive Airway Pressure (PAP) Therapy      2. Primary hypertension            Thank you for coming to the Sleep Medicine Clinic today! Your sleep medicine provider today was: Monse Aguayo, APRN-CNP Below is a summary of your treatment plan, other important information, and our contact numbers:    TREATMENT PLAN:   - Follow-up in 12 months.  - If not already done, sign up for 'My Chart' and send prescription requests or messages through this    Annual Reminders About Your Sleep Apnea    Your sleep apnea is well controlled based on reviewing your PAP Data Report.     General Reminders:  Continue current machine settings. Continue using machine every night. Need at least 4 hours daily usage.   Remember to clean your mask, tubings, and water chamber regularly as instructed.  Know the replacement schedule of your supplies/ accessories and contact your DME (durable medical equipment) provider if you are due for them.  Avoid driving or operating heavy machinery when drowsy. A person driving while sleepy is 5 times more likely to have an accident. If you feel sleepy, pull over and take a short power nap (sleep for less than 30 minutes). Otherwise, ask somebody to drive you.    Follow-up sooner through CloudPassagehart or calling our office if you have any of the following symptoms:  Snoring or stopping breathing while using the machine  Recurrence of fatigue, sleepiness, insomnia, and other symptoms you had prior using machine  Persistent or worsening fatigue or sleepiness despite regular use of machine  Issues tolerating the machine like bloating sensation, air hunger, too much hot air, too much pressure, taking off mask without recall in the middle of sleep, etc.     Other conservative  measures to improve sleep apnea:  Losing weight can lead to some improvement of BRITNI which means you will need lower pressures in machine to control your BRITNI. In some patients, they don't need to use the machine after bariatric surgery. Hence, consider medical and/or surgical weight loss especially if your BMI is more than 35.  Avoiding alcohol, sedative-hypnotics, and sedating medications is imperative as these substances can worsen snoring and sleep apnea  If you have nasal congestion or seasonal allergies, improving your breathing through the nose is critical for treating sleep apnea, tolerating CPAP, and improving your sleep; hence, using intranasal steroid spray like Flonase might help. Make sure you know the proper way to use it.  Stay off your back when sleeping.    Common issues with PAP machine:  Mask gets dislodged when turning to the side: Consider getting a CPAP pillow or switching to a mask with hose on top.     Dry mouth:  Your machine has built-in humidifier that heats up the air to prevent dry mouth. It can be adjusted to your comfort. You can try that first and increase setting one level one night at a time to check which setting is comfortable and effective in lessening dry mouth. If dry mouth persists despite humidity setting adjustment, may apply OTC Biotene gel over the gums at bedtime.  If Biotene gel is not effective, consider trying XEROSTOM gel from [employer].  Also, eliminate or reduce dose of meds that can cause dry mouth if possible. Lastly, may try getting a separate room humidifier machine.    Airleaks: Please call DME as they may need to adjust your mask or refit you with a different kind of mask. In addition, you can ask DME for tips on getting a good mask seal and mask fit.     Difficulty tolerating the mask: Contact your DME to try a different kind of mask and/or call office to get a referral to Sleep Psychologist for CPAP desensitization. CPAP desensitization technique is a set of  "strategies that helps patient cope with claustrophobia and anxiety related to wearing mask. Alternatively, we can do a daytime mini-sleep study called PAP-nap trial wherein you will try on different kinds of mask and the sleep technician will try different pressure settings on CPAP and BPAP machines to see which specific pressure is tolerable and comfortable for you.     Water droplets or moisture within the hose and/or mask: This is called rain-out and it is caused by condensation of too much heated humidity on the cooler walls of the hose. If you have rain-out, turn down humidity settings or get a heated hose. If you already have a heated hose, turn up the \"tube temperature\" of the heated hose. Alternatively, if you don't want to get a heated hose or warmer air, may wrap the CPAP hose with stockings to keep it somewhat warm. Also, you need to place the machine on the floor and lower the hose so that water won't travel upward towards your mask.     You can also go to the following EDUCATION WEBSITES for further information:   American Academy of Sleep Medicine http://sleepeducation.org  National Sleep Foundation: https://sleepfoundation.org  American Sleep Apnea Association: https://www.sleepapnea.org (for patients with sleep apnea)    Here at [facility], we wish you a restful sleep!     Instructions - Common BRITNI Recs: - For your sleep apnea, continue to use your PAP every night and use it whenever you are sleeping.   - Avoid alcohol or sedatives several hours prior to sleeping.   - Get additional supplies for your PAP (e.g., mask, hose, filters) every 3 months or as your insurance allows from your [employer] company. Replacement cushions for your PAP mask can be requested monthly if airseals are an issue.  - Remember to clean your mask, tubings, and water chamber regularly as instructed.  - Avoid driving or operating heavy machinery when drowsy. A person driving while sleepy is five (5) times more likely to " have an accident. If you feel sleepy, pull over and take a short power nap (sleep for less than 30 minutes). Otherwise, ask somebody to drive you.    EASY WAYS TO IMPROVE YOUR SLEEP:  1. Go to bed and wake up at the same time every day.   Aim for 8 hours but some people need less, some need more.   Get out of bed if you are not sleeping.   Limit naps to 20 min or less.   2. Expose yourself to daylight and/ or bright light in the morning.   Go outside or spend time near a window each morning.   You can use a light box (found on Amazon) if you wake before the sunrise.   Limit light exposure in the evenings (including electronic usage).   Try meditation, reading, stretching, deep breathing, warm shower or bath, or yoga nidra as part of your bedtime routine. There are many great FREE, videos or audio tracks on Startpack/ Enohm, etc for guidance.  3. Exercise, in some form, EVERY day, but not too close to bedtime. Consider making this part of your routine at the start of your day, followed by a cool shower.  4. Eat meals at roughly the same time every day. Make sure you are prioritizing fruits, vegetables, whole grains, lean proteins.  5. Time your caffeine intake. Make sure you are not drinking caffeine within 8 to 12 hours prior to your bedtime.   6. Avoid marijuana, alcohol, and nicotine. They will reduce sleep quality in any quantity.  7. Learn to manage anxiety. Psychology services at  can be reached at [phone] to schedule an appointment.     IMPORTANT INFORMATION:     Call [phone] for medical emergencies.  Our offices are generally open from Monday-Friday, 9 am - 5 pm.  If you need to get in touch with me, you may either call me and my team(number is below) or you can use CloudPartner.  If a referral for a test, for CPAP, or for another specialist was made, and you have not heard about scheduling this within a week, please call scheduling at [phone] ([phone]).  If you are unable to make your appointment for clinic  or an overnight study, kindly call the office at least 48 hours in advance to cancel and reschedule.  If you are on CPAP, please bring your device's card to each clinic appointment unless told otherwise by your provider.  There are no supporting services by either the sleep doctors or their staff on weekends and Holidays, or after 5 PM on weekdays.   If you have been asked to come to a sleep study, make sure you bring toiletries, a comfy pillow, and any nighttime medications that you may regularly take. Also be sure to eat dinner before you arrive. We generally do not provide meals.      PRESCRIPTIONS:  We require 7 days advanced notice for prescription refills. If we do not receive the request in this time, we cannot guarantee that your medication will be refilled in time. Please contact the sleep nurses listed below for refills or request via Admify.     IMPORTANT PHONE NUMBERS:   Sleep Medicine Clinic Fax: [phone]  Appointments (for Pediatric Sleep Clinic): [phone] - option 1  Appointments (for Adult Sleep Clinic): [phone] - option 2  Appointments (For Sleep Studies): [phone] - option 3  Behavioral Sleep Medicine: [phone]  Sleep Surgery: [phone]  ENT (Otolaryngology): [phone]  Headache Clinic (Neurology): [phone]  Neurology: [phone]  Psychiatry: [phone]  Pulmonary Function Testing (PFT) Center: [phone]  Pulmonary Medicine: [phone]  [employer] (DME): [phone]  [employer] (DME): [phone]   (DME): [phone]    Our Adult Sleep Medicine Team (Please do not hesitate to call the office or sleep nurse with any questions between appointments):    Adult Sleep Nurses (Juju Borges RN and Mirela Pop RN):  For clinical questions and refilling prescriptions: [phone]  Email sleep diaries and other documents at: [email or web address]    Adult Sleep Medicine  Secretaries:  Anny Pelaez (For Silvio/Novak/Krise/Strohl/Yeh): P: [phone]  F: [phone]  Geovanny Miranda ([place])Office: [phone] option 4 Office Fax: [phone]  Reena Vasquez (For Kong): P: [phone]  Fax: [phone]  Rosy Hasgiulia (For Jurcevic/Blank): P: [phone]  F: [phone]  Prettydanielito Landa (For Jeffrey): P: [phone]  F: [phone]  Suzette Morris (For Tiara/Morenita/Zakhary): P: [phone]  F: [phone]  Jeni Arais (For Riaz/Rivera): P: [phone]  F: [phone]     Adult Sleep Medicine Advanced Practice Providers:  Travis Nickerson ([place], [place])  Sloane Xavier ([facility])  Monse Aguayo CNP ([place], [place], [place])  Coleen Medina CNP ([place], [place], [place])  Roxanne Murphy ([place], [place], [place])  London Rivera CNP ([facility])      Our Sleep Testing Center (STC) Locations:  Our team will contact you to schedule your sleep study, however, you can contact us as follow:  Main Phone Line (scheduling only): [phone] ([phone]), option 3  Adult and Pediatric Locations  [facility] (6 years and older): [facility] - 4th floor ([address] [place] [address]) After hours line: [phone]  [facility] (Main campus: All ages): [facility], 6th floor. After hours line: [phone]  [facility] (5 years and older; younger considered on case-by-case basis): [facility]; [employer] [address]. Scheduling  After hours line: [phone]   [place] (6 years and older): [address]; [address]; [address]   [place] (6 years and older): [address] [facility], [address]  After hours line: [phone]   [place] (13 years and older) in [place]: [address] [facility], 2nd floor  After hours line: [phone]  [facility] (13 year and older): [address]  After hours line: [phone] (Home studies out of [place]  [facility])    Adult Only Locations:   [place] (18 years and older): [address] [place] [address], 2nd floor   [place] (18 years and older): [address] [facility]; 4th floor  After hours line: [phone]   [place] (18 years and older) at [place]: [address] [facility]  After hours line: [phone]        CONTACTING YOUR SLEEP MEDICINE PROVIDER:  Send a message directly to your provider through \"My Chart\", which is the email service through your  Records Account: [email or web address]   Call [phone] and leave a message. One of the administrative assistants will forward the message to your sleep medicine provider through \"My Chart\" and/or email.     Your sleep medicine provider for this visit was: Monse Aguayo, APRN-CNP    In the event that you are running more than 15 minutes late to your appointment, I will kindly ask you to reschedule.       "

## 2024-12-03 NOTE — ASSESSMENT & PLAN NOTE
Mild Obstructive Sleep Apnea 7/9/20 with AHI of 12.1 and SpO2 selma of 84%.  PAP titration was completed 8/6/20 showing CPAP of 6 cwp adequately controlled BRITNI.  -Well controlled on current settings  -continue current PAP settings  -Supply order updated today  -RTC 12 mo or sooner if needed

## 2025-03-13 DIAGNOSIS — I10 PRIMARY HYPERTENSION: ICD-10-CM

## 2025-03-13 RX ORDER — TRIAMTERENE AND HYDROCHLOROTHIAZIDE 37.5; 25 MG/1; MG/1
1 CAPSULE ORAL DAILY
Qty: 90 CAPSULE | Refills: 2 | Status: SHIPPED | OUTPATIENT
Start: 2025-03-13

## 2025-05-14 ASSESSMENT — ENCOUNTER SYMPTOMS
APPETITE CHANGE: 0
FREQUENCY: 0
ABDOMINAL PAIN: 0
VOMITING: 0
HEADACHES: 0
FATIGUE: 0
BACK PAIN: 0
EYE DISCHARGE: 0
ADENOPATHY: 0
HALLUCINATIONS: 0
DYSURIA: 0
PALPITATIONS: 0
NECK PAIN: 0
EYE REDNESS: 0
TROUBLE SWALLOWING: 0
POLYDIPSIA: 0
DIZZINESS: 0
BRUISES/BLEEDS EASILY: 0
WOUND: 0
UNEXPECTED WEIGHT CHANGE: 0
CHILLS: 0
CONFUSION: 0
EYE PAIN: 0
COUGH: 0
BLOOD IN STOOL: 0
HEMATURIA: 0
SORE THROAT: 0
FEVER: 0
SHORTNESS OF BREATH: 0
POLYPHAGIA: 0

## 2025-05-14 NOTE — PROGRESS NOTES
Subjective   Patient ID: Marcelle Macdonald is a 45 y.o. female who presents for Annual Exam.    Is pt fasting? Yes  Does pt see any providers other than care team below: No  javed Aguayo bennett, paul  Did pt see ortho for rt wrist pain? No, has been fine  Did pt see therapist? No, has been fine    Any forms to fill out?  No  Does pt have next pap set up? No  Due 6/2025  Is pt taking vitamin d 2000iu 1 a day consistently? Yes  using cpap nightly? yes  Bps at home- averaging same as today  Last colonoscopy? Never  Any other questions or concerns that pt wants to discuss today?  Weight issues    Poc A1c=5.7  Phq9= 3  , gad7=4        No care team member to display    HPI  Last labs-11/2024 A1c 6.1,  Sugar (aka glucose), kidney function, liver function and electrolytes in the CMP (comprehensive metabolic panel) were normal.   5/2024 A1c 5.8  CBC (complete blood count) was normal which looks at infection and anemia markers.  Trigs normal.  Hdl low at 40. Goal >50 for women. Incr exercise.  Ldl high at 124. Goal <100. Decr fats and incr exercise.  TSH (thyroid test) was normal.  Sugar (aka glucose), kidney function, liver function and electrolytes in the CMP (comprehensive metabolic panel) were normal.  2/2024 A1c 5.7  8/2023 vit d nl, tsh nl, ldl 122, hdl 43, cbc nl, cmp nl  Due for labs- all    Cholesterol   Date Value Ref Range Status   05/13/2024 183 0 - 199 mg/dL Final     Comment:           Age      Desirable   Borderline High   High     0-19 Y     0 - 169       170 - 199     >/= 200    20-24 Y     0 - 189       190 - 224     >/= 225         >24 Y     0 - 199       200 - 239     >/= 240   **All ranges are based on fasting samples. Specific   therapeutic targets will vary based on patient-specific   cardiac risk.    Pediatric guidelines reference:Pediatrics 2011, 128(S5).Adult guidelines reference: NCEP ATPIII Guidelines,BEATRICE 2001, 258:2486-97    Venipuncture immediately after or during the administration of  Metamizole may lead to falsely low results. Testing should be performed immediately prior to Metamizole dosing.   08/16/2023 177 0 - 199 mg/dL Final     Comment:     .      AGE      DESIRABLE   BORDERLINE HIGH   HIGH     0-19 Y     0 - 169       170 - 199     >/= 200    20-24 Y     0 - 189       190 - 224     >/= 225         >24 Y     0 - 199       200 - 239     >/= 240   **All ranges are based on fasting samples. Specific   therapeutic targets will vary based on patient-specific   cardiac risk.  .   Pediatric guidelines reference:Pediatrics 2011, 128(S5).   Adult guidelines reference: NCEP ATPIII Guidelines,     BEATRICE 2001, 258:2486-97  .   Venipuncture immediately after or during the    administration of Metamizole may lead to falsely   low results. Testing should be performed immediately   prior to Metamizole dosing.   07/20/2022 186 0 - 199 mg/dL Final     Comment:     .      AGE      DESIRABLE   BORDERLINE HIGH   HIGH     0-19 Y     0 - 169       170 - 199     >/= 200    20-24 Y     0 - 189       190 - 224     >/= 225         >24 Y     0 - 199       200 - 239     >/= 240   **All ranges are based on fasting samples. Specific   therapeutic targets will vary based on patient-specific   cardiac risk.  .   Pediatric guidelines reference:Pediatrics 2011, 128(S5).   Adult guidelines reference: NCEP ATPIII Guidelines,     BEATRICE 2001, 258:2486-97  .   Venipuncture immediately after or during the    administration of Metamizole may lead to falsely   low results. Testing should be performed immediately   prior to Metamizole dosing.     07/26/2019 164 0 - 199 mg/dL Final     Comment:     .      AGE      DESIRABLE   BORDERLINE HIGH   HIGH     0-19 Y     0 - 169       170 - 199     >/= 200    20-24 Y     0 - 189       190 - 224     >/= 225         >24 Y     0 - 199       200 - 239     >/= 240   **All ranges are based on fasting samples. Specific   therapeutic targets will vary based on patient-specific   cardiac risk.  .    Pediatric guidelines reference:Pediatrics 2011, 128(S5).   Adult guidelines reference: NCEP ATPIII Guidelines,     BEATRICE 2001, 258:2486-97  .   Venipuncture immediately after or during the    administration of Metamizole may lead to falsely   low results. Testing should be performed immediately   prior to Metamizole dosing.       Triglycerides   Date Value Ref Range Status   05/13/2024 92 0 - 149 mg/dL Final     Comment:        Age         Desirable   Borderline High   High     Very High   0 D-90 D    19 - 174         ----         ----        ----  91 D- 9 Y     0 -  74        75 -  99     >/= 100      ----    10-19 Y     0 -  89        90 - 129     >/= 130      ----    20-24 Y     0 - 114       115 - 149     >/= 150      ----         >24 Y     0 - 149       150 - 199    200- 499    >/= 500    Venipuncture immediately after or during the administration of Metamizole may lead to falsely low results. Testing should be performed immediately prior to Metamizole dosing.   08/16/2023 58 0 - 149 mg/dL Final     Comment:     .      AGE      DESIRABLE   BORDERLINE HIGH   HIGH     VERY HIGH   0 D-90 D    19 - 174         ----         ----        ----  91 D- 9 Y     0 -  74        75 -  99     >/= 100      ----    10-19 Y     0 -  89        90 - 129     >/= 130      ----    20-24 Y     0 - 114       115 - 149     >/= 150      ----         >24 Y     0 - 149       150 - 199    200- 499    >/= 500  .   Venipuncture immediately after or during the    administration of Metamizole may lead to falsely   low results. Testing should be performed immediately   prior to Metamizole dosing.   07/20/2022 80 0 - 149 mg/dL Final     Comment:     .      AGE      DESIRABLE   BORDERLINE HIGH   HIGH     VERY HIGH   0 D-90 D    19 - 174         ----         ----        ----  91 D- 9 Y     0 -  74        75 -  99     >/= 100      ----    10-19 Y     0 -  89        90 - 129     >/= 130      ----    20-24 Y     0 - 114       115 - 149     >/= 150       ----         >24 Y     0 - 149       150 - 199    200- 499    >/= 500  .   Venipuncture immediately after or during the    administration of Metamizole may lead to falsely   low results. Testing should be performed immediately   prior to Metamizole dosing.     07/26/2019 84 0 - 149 mg/dL Final     Comment:     .      AGE      DESIRABLE   BORDERLINE HIGH   HIGH     VERY HIGH   0 D-90 D    19 - 174         ----         ----        ----  91 D- 9 Y     0 -  74        75 -  99     >/= 100      ----    10-19 Y     0 -  89        90 - 129     >/= 130      ----    20-24 Y     0 - 114       115 - 149     >/= 150      ----         >24 Y     0 - 149       150 - 199    200- 499    >/= 500  .   Venipuncture immediately after or during the    administration of Metamizole may lead to falsely   low results. Testing should be performed immediately   prior to Metamizole dosing.       HDL-Cholesterol   Date Value Ref Range Status   05/13/2024 40.9 mg/dL Final     Comment:       Age       Very Low   Low     Normal    High    0-19 Y    < 35      < 40     40-45     ----  20-24 Y    ----     < 40      >45      ----        >24 Y      ----     < 40     40-60      >60       HDL   Date Value Ref Range Status   08/16/2023 43.0 mg/dL Final     Comment:     .      AGE      VERY LOW   LOW     NORMAL    HIGH       0-19 Y       < 35   < 40     40-45     ----    20-24 Y       ----   < 40       >45     ----      >24 Y       ----   < 40     40-60      >60  .   07/20/2022 47.0 mg/dL Final     Comment:     .      AGE      VERY LOW   LOW     NORMAL    HIGH       0-19 Y       < 35   < 40     40-45     ----    20-24 Y       ----   < 40       >45     ----      >24 Y       ----   < 40     40-60      >60  .     07/26/2019 39.6 (A) mg/dL Final     Comment:     .      AGE      VERY LOW   LOW     NORMAL    HIGH       0-19 Y       < 35   < 40     40-45     ----    20-24 Y       ----   < 40       >45     ----      >24 Y       ----   < 40     40-60      >60  .    "    No results found for: \"LDL\"  Thyroid Stimulating Hormone   Date Value Ref Range Status   05/13/2024 1.80 0.44 - 3.98 mIU/L Final     No results found for: \"A1C\"  No components found for: \"POCA1C\"  No results found for: \"ALBUR\"  No components found for: \"POCALBUR\"      Other concerns:  wt mgmt    Lf sided cp on off into back <6mon  Happened 2 times last wk.  Some sob with it.    ER/urgicare visits in the last year- none  Hospitalizations in the last year- none      last Pap- 6/23/20; due 6/2025  H/o abn pap-remote    FH ovarian, cervical, uterine ca-none    Current birth control method-mirena  No change in contraception desired        last mammo- (40-75/90) 1/22/25 SW gen    FH br ca-pat gm      FH colon ca-none      Exercise- 545a 2mi walk with neighbor; 60d shred thru gym-pilates, emile; wts; avging 9000 steps a day+  Diet-3 meals a day -watching what she is eating/mindful. Eating fruits and veggies  No soda x 2mon; just water  Body mass index is 38.54 kg/m².    last eye dr appt- contacts; 1/2025  No vision issues    last dental appt- 4/2025    BMs-regular  Sleep-able to fall asleep and stay asleep; no snoring or apnea; uses cpap nightly  no cp, swelling, sob, abd pain, n/v/d/c, blood in stool or black stools  STI testing including hiv (age 15-65) and hep c screening (18-79)-declines        Immunization History   Administered Date(s) Administered    Flu vaccine (IIV4), preservative free *Check age/dose* 09/27/2023    Flu vaccine, trivalent, preservative free, age 6 months and greater (Fluarix/Fluzone/Flulaval) 11/15/2024    Influenza, seasonal, injectable 11/19/2021    Moderna SARS-CoV-2 Vaccination 12/10/2021    PPD Test 05/31/2016, 08/14/2018, 08/28/2018, 07/26/2019    Pfizer Purple Cap SARS-CoV-2 03/22/2021, 04/12/2021    Tdap vaccine, age 7 year and older (BOOSTRIX, ADACEL) 02/21/2007, 06/20/2018           fractures in lifetime-lf arm  Anyone with osteoporosis in the family-none    FH heart attack, heart " surgery-dad-chf, afib; pgf-mi  FH stroke-none    The ASCVD Risk score (Nestor DK, et al., 2019) failed to calculate for the following reasons:    Unable to determine if patient is Non-   Coronary Artery Calcium score:  This test is recommended for men 45 or older and women 55 or older without a history of heart disease and have 1 risk factor (high LDL cholesterol, low HDL cholesterol, high blood pressure, smoker (current or past), type 2 diabetes, IBD, lupus, RA, ankylosing spondylitis, psoriasis or family history of  heart disease <55yrs in dad, brother or child or <65yrs in mom, sister, or child.)       Review of Systems   Constitutional:  Negative for appetite change, chills, fatigue, fever and unexpected weight change.   HENT:  Negative for congestion, ear pain, sore throat and trouble swallowing.    Eyes:  Negative for pain, discharge and redness.   Respiratory:  Negative for cough and shortness of breath.    Cardiovascular:  Negative for chest pain and palpitations.   Gastrointestinal:  Negative for abdominal pain, blood in stool and vomiting.   Endocrine: Negative for polydipsia, polyphagia and polyuria.   Genitourinary:  Negative for dysuria, frequency, hematuria and urgency.   Musculoskeletal:  Negative for back pain and neck pain.   Skin:  Negative for rash and wound.   Allergic/Immunologic: Negative for immunocompromised state.   Neurological:  Negative for dizziness, syncope and headaches.   Hematological:  Negative for adenopathy. Does not bruise/bleed easily.   Psychiatric/Behavioral:  Negative for confusion and hallucinations.        Objective   Visit Vitals  /82   Pulse 60   Temp 36.8 °C (98.3 °F)        BP Readings from Last 3 Encounters:   05/19/25 134/82   12/03/24 127/75   11/15/24 124/82     Wt Readings from Last 3 Encounters:   05/19/25 118 kg (261 lb)   12/03/24 115 kg (254 lb)   11/15/24 118 kg (259 lb 6.4 oz)           Physical Exam  Constitutional:        General: She is not in acute distress.     Appearance: Normal appearance. She is not ill-appearing.   HENT:      Head: Normocephalic.      Right Ear: Tympanic membrane, ear canal and external ear normal.      Left Ear: Tympanic membrane, ear canal and external ear normal.      Nose: Nose normal.      Mouth/Throat:      Mouth: Mucous membranes are moist.      Pharynx: Oropharynx is clear.   Eyes:      Extraocular Movements: Extraocular movements intact.      Conjunctiva/sclera: Conjunctivae normal.      Pupils: Pupils are equal, round, and reactive to light.   Cardiovascular:      Rate and Rhythm: Normal rate and regular rhythm.      Heart sounds: Normal heart sounds. No murmur heard.  Pulmonary:      Effort: Pulmonary effort is normal. No respiratory distress.      Breath sounds: Normal breath sounds. No wheezing, rhonchi or rales.   Abdominal:      General: Bowel sounds are normal.      Palpations: Abdomen is soft. There is no mass.      Tenderness: There is no abdominal tenderness.   Musculoskeletal:         General: No swelling or tenderness. Normal range of motion.      Cervical back: Normal range of motion and neck supple.      Right lower leg: No edema.      Left lower leg: No edema.   Skin:     General: Skin is warm.      Findings: No rash.   Neurological:      General: No focal deficit present.      Mental Status: She is alert and oriented to person, place, and time.      Cranial Nerves: No cranial nerve deficit.      Motor: No weakness.   Psychiatric:         Mood and Affect: Mood normal.         Behavior: Behavior normal.         Assessment/Plan   Diagnoses and all orders for this visit:  Routine general medical examination at a health care facility  -     Comprehensive Metabolic Panel; Future  -     CBC and Auto Differential; Future  -     Lipid Panel; Future  -     TSH with reflex to Free T4 if abnormal; Future  Vitamin D deficiency  -     Vitamin D 25-Hydroxy,Total (for eval of Vitamin D levels);  Future  Primary hypertension  Prediabetes  -     POCT glycosylated hemoglobin (Hb A1C) manually resulted  Screening for tuberculosis  -     T-Spot TB; Future  Atypical chest pain  -     ECG 12 Lead  -     XR chest 2 views; Future  SOB (shortness of breath)  -     ECG 12 Lead  -     XR chest 2 views; Future  BMI 38.0-38.9,adult  Class 2 severe obesity due to excess calories with serious comorbidity and body mass index (BMI) of 38.0 to 38.9 in adult  Other orders  -     Follow Up In Primary Care - Health Maintenance  -     Follow Up In Primary Care - Established; Future

## 2025-05-19 ENCOUNTER — APPOINTMENT (OUTPATIENT)
Dept: PRIMARY CARE | Facility: CLINIC | Age: 45
End: 2025-05-19
Payer: COMMERCIAL

## 2025-05-19 VITALS
WEIGHT: 261 LBS | BODY MASS INDEX: 38.66 KG/M2 | DIASTOLIC BLOOD PRESSURE: 82 MMHG | HEART RATE: 60 BPM | TEMPERATURE: 98.3 F | HEIGHT: 69 IN | SYSTOLIC BLOOD PRESSURE: 134 MMHG

## 2025-05-19 DIAGNOSIS — R07.89 ATYPICAL CHEST PAIN: ICD-10-CM

## 2025-05-19 DIAGNOSIS — E66.01 CLASS 2 SEVERE OBESITY DUE TO EXCESS CALORIES WITH SERIOUS COMORBIDITY AND BODY MASS INDEX (BMI) OF 38.0 TO 38.9 IN ADULT: ICD-10-CM

## 2025-05-19 DIAGNOSIS — Z00.00 ROUTINE GENERAL MEDICAL EXAMINATION AT A HEALTH CARE FACILITY: Primary | ICD-10-CM

## 2025-05-19 DIAGNOSIS — E55.9 VITAMIN D DEFICIENCY: ICD-10-CM

## 2025-05-19 DIAGNOSIS — R06.02 SOB (SHORTNESS OF BREATH): ICD-10-CM

## 2025-05-19 DIAGNOSIS — I10 PRIMARY HYPERTENSION: ICD-10-CM

## 2025-05-19 DIAGNOSIS — R73.03 PREDIABETES: ICD-10-CM

## 2025-05-19 DIAGNOSIS — E66.812 CLASS 2 SEVERE OBESITY DUE TO EXCESS CALORIES WITH SERIOUS COMORBIDITY AND BODY MASS INDEX (BMI) OF 38.0 TO 38.9 IN ADULT: ICD-10-CM

## 2025-05-19 DIAGNOSIS — Z11.1 SCREENING FOR TUBERCULOSIS: ICD-10-CM

## 2025-05-19 LAB — POC HEMOGLOBIN A1C: 5.7 % (ref 4.2–6.5)

## 2025-05-19 PROCEDURE — 3008F BODY MASS INDEX DOCD: CPT | Performed by: NURSE PRACTITIONER

## 2025-05-19 PROCEDURE — 3075F SYST BP GE 130 - 139MM HG: CPT | Performed by: NURSE PRACTITIONER

## 2025-05-19 PROCEDURE — 99214 OFFICE O/P EST MOD 30 MIN: CPT | Performed by: NURSE PRACTITIONER

## 2025-05-19 PROCEDURE — 99396 PREV VISIT EST AGE 40-64: CPT | Performed by: NURSE PRACTITIONER

## 2025-05-19 PROCEDURE — 83036 HEMOGLOBIN GLYCOSYLATED A1C: CPT | Performed by: NURSE PRACTITIONER

## 2025-05-19 PROCEDURE — 1036F TOBACCO NON-USER: CPT | Performed by: NURSE PRACTITIONER

## 2025-05-19 PROCEDURE — 3079F DIAST BP 80-89 MM HG: CPT | Performed by: NURSE PRACTITIONER

## 2025-05-19 PROCEDURE — 93000 ELECTROCARDIOGRAM COMPLETE: CPT | Performed by: NURSE PRACTITIONER

## 2025-05-19 ASSESSMENT — PATIENT HEALTH QUESTIONNAIRE - PHQ9
7. TROUBLE CONCENTRATING ON THINGS, SUCH AS READING THE NEWSPAPER OR WATCHING TELEVISION: SEVERAL DAYS
10. IF YOU CHECKED OFF ANY PROBLEMS, HOW DIFFICULT HAVE THESE PROBLEMS MADE IT FOR YOU TO DO YOUR WORK, TAKE CARE OF THINGS AT HOME, OR GET ALONG WITH OTHER PEOPLE: SOMEWHAT DIFFICULT
6. FEELING BAD ABOUT YOURSELF - OR THAT YOU ARE A FAILURE OR HAVE LET YOURSELF OR YOUR FAMILY DOWN: NOT AT ALL
SUM OF ALL RESPONSES TO PHQ9 QUESTIONS 1 AND 2: 2
4. FEELING TIRED OR HAVING LITTLE ENERGY: NOT AT ALL
5. POOR APPETITE OR OVEREATING: NOT AT ALL
9. THOUGHTS THAT YOU WOULD BE BETTER OFF DEAD, OR OF HURTING YOURSELF: NOT AT ALL
3. TROUBLE FALLING OR STAYING ASLEEP OR SLEEPING TOO MUCH: NOT AT ALL
2. FEELING DOWN, DEPRESSED OR HOPELESS: SEVERAL DAYS
8. MOVING OR SPEAKING SO SLOWLY THAT OTHER PEOPLE COULD HAVE NOTICED. OR THE OPPOSITE, BEING SO FIGETY OR RESTLESS THAT YOU HAVE BEEN MOVING AROUND A LOT MORE THAN USUAL: NOT AT ALL
1. LITTLE INTEREST OR PLEASURE IN DOING THINGS: SEVERAL DAYS
SUM OF ALL RESPONSES TO PHQ QUESTIONS 1-9: 3

## 2025-05-19 ASSESSMENT — ANXIETY QUESTIONNAIRES
6. BECOMING EASILY ANNOYED OR IRRITABLE: SEVERAL DAYS
7. FEELING AFRAID AS IF SOMETHING AWFUL MIGHT HAPPEN: NOT AT ALL
3. WORRYING TOO MUCH ABOUT DIFFERENT THINGS: NOT AT ALL
2. NOT BEING ABLE TO STOP OR CONTROL WORRYING: NOT AT ALL
4. TROUBLE RELAXING: MORE THAN HALF THE DAYS
GAD7 TOTAL SCORE: 4
5. BEING SO RESTLESS THAT IT IS HARD TO SIT STILL: NOT AT ALL
1. FEELING NERVOUS, ANXIOUS, OR ON EDGE: SEVERAL DAYS
IF YOU CHECKED OFF ANY PROBLEMS ON THIS QUESTIONNAIRE, HOW DIFFICULT HAVE THESE PROBLEMS MADE IT FOR YOU TO DO YOUR WORK, TAKE CARE OF THINGS AT HOME, OR GET ALONG WITH OTHER PEOPLE: SOMEWHAT DIFFICULT

## 2025-05-19 ASSESSMENT — PROMIS GLOBAL HEALTH SCALE
RATE_SOCIAL_SATISFACTION: VERY GOOD
RATE_AVERAGE_PAIN: 1
EMOTIONAL_PROBLEMS: RARELY
CARRYOUT_PHYSICAL_ACTIVITIES: MOSTLY
RATE_MENTAL_HEALTH: GOOD
RATE_GENERAL_HEALTH: GOOD
RATE_AVERAGE_FATIGUE: MILD
CARRYOUT_SOCIAL_ACTIVITIES: VERY GOOD
RATE_PHYSICAL_HEALTH: VERY GOOD
RATE_QUALITY_OF_LIFE: GOOD

## 2025-05-19 NOTE — PATIENT INSTRUCTIONS
Set up next pap with dr green    A1C today=5.7  This is the 3 month average of your sugars.  You are in the prediabetes range 5.7-6.4    1600-1800cal/d  More cardio than wts  Continue water    Options: dietician, fitter me program, wt loss meds    Meds for weight loss:  Wegovy, saxenda, zepbound, qsymia, contrave-check pharmacy insurance for coverage.  These meds are not well covered even with a prior authorization.  Ozempic and trulicity and mounjaro are covered by your insurance with a diagnosis attached of diabetes. Without that diagnosis, it will not be covered.     If nothing is covered, you can consider:  Qsymia-$99 thru [facility]  OR  Contrave-$98 thru [facility]  These are pharmacies the  has contracted with to give a discounted price.      EKG today  Chest xray today        Handouts given to pt:  physical handout      Labs- No appt needed:    You can use the lab in our building when fasting. The hrs are: Mon-Fri 7a-330p.  No Saturday hrs. Bring the paper order.   OR   [facility] Mon-Fri 7a-12p. No Saturday hrs. Bring the paper order.  OR   [facility] Mon-Fri 630a-530p or Sat 6:30a-12p. Bring the paper order  OR  [facility] Mon-FrI 7a-5p  [facility] Mon-Fri 730a-4p, Sat  8a-12p  [facility] [address] Mon-Thurs 630a-6p , Fri 630a-4p, Sat 8a-12p  [facility] [address] Mon-Fri 7a-630p and Sat. 7a-3p    Fasting is no food, drink, gum or mints other than water for 12 hrs.   Results will be back in 2-3 business days for most labs. It is always recommended for any orders (labs, xrays, ultrasounds,MRI, ct scan, procedures etc) to check with your insurance provider for expected costs or expenses to you.         You will get your results via phone from my medical assistant if you do not have MyChart.  OR  You will get your results via [employer]    If a result is urgent, I will call to speak to you.    Vaccines:  Up to  date    General recommendations:  Exercise-cardio 4-5d/wk 30min each day  Diet-Breakfast-toast (my favorite Earlene Aldridge Delighful Multigrain or Kwesi's Killer Bread Good Seed thin-sliced)/bagel/English muffin-whole wheat flour as a 1st ingredient or cereal/oatmeal/granola bar-fiber 4g or more or protein like eggs or peanut butter; optional veggies  Lunch-protein, 1/2c carb or 2 slices bread, veg 1c  Dinner-protein, fist sized carb, veg 1c  Fruit 2 a day  Dairy 2 a day-milk, soy milk, almond milk, cheese, yogurt, cottage cheese  Snacks-Protein-hard boiled egg, nuts (walnuts/almonds/pecans/pistachios 1/4c), hummus, beef/deer jerky or meat sticks; vegetable, fruit, dairy-milk(1%, skim, almond, soy)/cheese (not a lot of cheddar)/yogurt (Greek is best-my favorite Dannon Fruit on the Bottom Greek)/cottage cheese 2%; triscuits/ popcorn/wheat thins have a lot of fiber; follow serving size on bag/box/container  increase water  Limit alcohol to 1 drink per day for women and 2 drinks per day for men (1 drink=12oz beer or 5oz wine or 1 1/2oz liquor)  Calcium: 500mg 1 twice a day if age 50 and younger and 600mg 1 twice a day if over age 50 (calcium citrate can be taken without food)  Vitamin D: 800-5000 IU/day  Limit salt to <2300mg a day if age 50 and under and <1500mg a day if over age 50/have high bp or diabetes or kidney disease  Recommend folate for childbearing age women 0.4mg per day (can be found in a multivitamin)  Recommend 18mg/dL of iron a day if age 50 and under and 8mg/dL a day if over age 50; take on an empty stomach at bedtime  Use sunscreen   Wear seatbelt  Recommend safe sex practices: using condoms everytime you have sex, discuss with a new partner about their past partners/history of STDs/drug use, avoid drinking alcohol or using drugs as this increases the chance that you will participate in high-risk sex, for oral sex help protect your mouth by having your partner use a condom (male or female), women should not  douche after sex, be aware of your partner's body and your body-look for signs of a sore, blister, rash, or discharge, and have regular exams and periodic tests for STDs.  No distracted driving  No driving when under influence of substances  Wear a seatbelt  Eye dr every 1-2yrs  Dentist every 6-12 mon  Tetanus shot every 10yrs  Recommend flu vaccine in the fall  Appt in 6mon for follow up on diet exercise and 1 year for physical      I will communicate with you via [employer] regarding messages and results. If you need help with this, you can call the support line at [phone].    IT WAS A PLEASURE TO SEE YOU TODAY. THANK YOU FOR CHOOSING US FOR YOUR HEALTHCARE NEEDS.

## 2025-05-20 ENCOUNTER — TELEPHONE (OUTPATIENT)
Dept: PRIMARY CARE | Facility: CLINIC | Age: 45
End: 2025-05-20
Payer: COMMERCIAL

## 2025-05-20 NOTE — TELEPHONE ENCOUNTER
----- Message from Kim Calderon sent at 5/20/2025 12:52 PM EDT -----  CBC (complete blood count) was normal which looks at infection and anemia markers.  Trigs normal  Hdl low at 45. Goal >50 for women. Incr exercise.  Ldl high at 120. Goal <100. Decr fats and incr fiber.   Sugar (aka glucose), kidney function, liver function and electrolytes in the CMP (comprehensive metabolic panel) were normal.  TSH (thyroid test) was normal.  Vitamin d is normal. Continue the otc vitamin d.  Tb test result not back yet.  It is not letting me send the message via [employer]  ----- Message -----  From: Mikhail Romero  Sent: 5/19/2025   8:16 AM EDT  To: KUSHAL Ardon-CNP

## 2025-05-21 LAB
25(OH)D3+25(OH)D2 SERPL-MCNC: 36 NG/ML (ref 30–100)
ALBUMIN SERPL-MCNC: 4.4 G/DL (ref 3.6–5.1)
ALP SERPL-CCNC: 64 U/L (ref 31–125)
ALT SERPL-CCNC: 14 U/L (ref 6–29)
ANION GAP SERPL CALCULATED.4IONS-SCNC: 9 MMOL/L (CALC) (ref 7–17)
AST SERPL-CCNC: 12 U/L (ref 10–35)
BASOPHILS # BLD AUTO: 43 CELLS/UL (ref 0–200)
BASOPHILS NFR BLD AUTO: 0.5 %
BILIRUB SERPL-MCNC: 0.5 MG/DL (ref 0.2–1.2)
BUN SERPL-MCNC: 12 MG/DL (ref 7–25)
CALCIUM SERPL-MCNC: 9.4 MG/DL (ref 8.6–10.2)
CHLORIDE SERPL-SCNC: 101 MMOL/L (ref 98–110)
CHOLEST SERPL-MCNC: 181 MG/DL
CHOLEST/HDLC SERPL: 4 (CALC)
CO2 SERPL-SCNC: 28 MMOL/L (ref 20–32)
CREAT SERPL-MCNC: 0.71 MG/DL (ref 0.5–0.99)
EGFRCR SERPLBLD CKD-EPI 2021: 107 ML/MIN/1.73M2
EOSINOPHIL # BLD AUTO: 138 CELLS/UL (ref 15–500)
EOSINOPHIL NFR BLD AUTO: 1.6 %
ERYTHROCYTE [DISTWIDTH] IN BLOOD BY AUTOMATED COUNT: 14.4 % (ref 11–15)
GAMMA INTERFERON BACKGROUND BLD IA-ACNC: 0.06 IU/ML
GLUCOSE SERPL-MCNC: 89 MG/DL (ref 65–99)
HCT VFR BLD AUTO: 43.7 % (ref 35–45)
HDLC SERPL-MCNC: 45 MG/DL
HGB BLD-MCNC: 13.8 G/DL (ref 11.7–15.5)
LDLC SERPL CALC-MCNC: 120 MG/DL (CALC)
LYMPHOCYTES # BLD AUTO: 2408 CELLS/UL (ref 850–3900)
LYMPHOCYTES NFR BLD AUTO: 28 %
M TB IFN-G BLD-IMP: NEGATIVE
M TB IFN-G CD4+ BCKGRND COR BLD-ACNC: 0 IU/ML
M TB IFN-G CD4+CD8+ BCKGRND COR BLD-ACNC: <0 IU/ML
MCH RBC QN AUTO: 28.2 PG (ref 27–33)
MCHC RBC AUTO-ENTMCNC: 31.6 G/DL (ref 32–36)
MCV RBC AUTO: 89.2 FL (ref 80–100)
MITOGEN IGNF BCKGRD COR BLD-ACNC: 9.14 IU/ML
MONOCYTES # BLD AUTO: 499 CELLS/UL (ref 200–950)
MONOCYTES NFR BLD AUTO: 5.8 %
NEUTROPHILS # BLD AUTO: 5513 CELLS/UL (ref 1500–7800)
NEUTROPHILS NFR BLD AUTO: 64.1 %
NONHDLC SERPL-MCNC: 136 MG/DL (CALC)
PLATELET # BLD AUTO: 287 THOUSAND/UL (ref 140–400)
PMV BLD REES-ECKER: 11.7 FL (ref 7.5–12.5)
POTASSIUM SERPL-SCNC: 4.3 MMOL/L (ref 3.5–5.3)
PROT SERPL-MCNC: 7.1 G/DL (ref 6.1–8.1)
RBC # BLD AUTO: 4.9 MILLION/UL (ref 3.8–5.1)
SODIUM SERPL-SCNC: 138 MMOL/L (ref 135–146)
TRIGL SERPL-MCNC: 69 MG/DL
TSH SERPL-ACNC: 2.04 MIU/L
WBC # BLD AUTO: 8.6 THOUSAND/UL (ref 3.8–10.8)

## 2025-05-27 ENCOUNTER — TELEPHONE (OUTPATIENT)
Dept: PRIMARY CARE | Facility: CLINIC | Age: 45
End: 2025-05-27
Payer: COMMERCIAL

## 2025-05-27 NOTE — TELEPHONE ENCOUNTER
----- Message from Kim Calderon sent at 5/26/2025 11:58 AM EDT -----  Chest xray is normal.    Epic is not letting me send this via [employer]  ----- Message -----  From: Arti Alexandra - Imaging Results In  Sent: 5/25/2025  11:30 AM EDT  To: Kim Calderon, KUSHAL-CNP

## 2025-10-08 ENCOUNTER — APPOINTMENT (OUTPATIENT)
Dept: PRIMARY CARE | Facility: CLINIC | Age: 45
End: 2025-10-08
Payer: COMMERCIAL

## 2025-11-21 ENCOUNTER — APPOINTMENT (OUTPATIENT)
Dept: PRIMARY CARE | Facility: CLINIC | Age: 45
End: 2025-11-21
Payer: COMMERCIAL